# Patient Record
Sex: MALE | Race: WHITE | NOT HISPANIC OR LATINO | Employment: UNEMPLOYED | URBAN - NONMETROPOLITAN AREA
[De-identification: names, ages, dates, MRNs, and addresses within clinical notes are randomized per-mention and may not be internally consistent; named-entity substitution may affect disease eponyms.]

---

## 2017-01-03 ENCOUNTER — OFFICE VISIT (OUTPATIENT)
Dept: GASTROENTEROLOGY | Facility: CLINIC | Age: 32
End: 2017-01-03

## 2017-01-03 VITALS
HEIGHT: 72 IN | BODY MASS INDEX: 32.64 KG/M2 | OXYGEN SATURATION: 96 % | HEART RATE: 103 BPM | DIASTOLIC BLOOD PRESSURE: 86 MMHG | SYSTOLIC BLOOD PRESSURE: 142 MMHG | WEIGHT: 241 LBS

## 2017-01-03 DIAGNOSIS — B18.2 CHRONIC HEPATITIS C WITHOUT HEPATIC COMA (HCC): Primary | ICD-10-CM

## 2017-01-03 DIAGNOSIS — K29.70 GASTRITIS WITHOUT BLEEDING, UNSPECIFIED CHRONICITY, UNSPECIFIED GASTRITIS TYPE: ICD-10-CM

## 2017-01-03 PROCEDURE — 99213 OFFICE O/P EST LOW 20 MIN: CPT | Performed by: INTERNAL MEDICINE

## 2017-01-03 RX ORDER — LEDIPASVIR AND SOFOSBUVIR 90; 400 MG/1; MG/1
1 TABLET, FILM COATED ORAL DAILY
Qty: 30 TABLET | Refills: 2 | Status: SHIPPED | OUTPATIENT
Start: 2017-01-03 | End: 2017-12-15

## 2017-01-03 RX ORDER — PANTOPRAZOLE SODIUM 40 MG/1
40 TABLET, DELAYED RELEASE ORAL DAILY
Qty: 30 TABLET | Refills: 5 | Status: SHIPPED | OUTPATIENT
Start: 2017-01-03 | End: 2017-12-13 | Stop reason: SDUPTHER

## 2017-01-03 NOTE — LETTER
January 3, 2017     Rich Harrington MD  [address] [place] [address] [address] [place] [address]    Patient: Christiano King   YOB: 1985   Date of Visit: 1/3/2017       Dear Dr. Len MD:    Thank you for referring Christiano King to me for evaluation. Below are the relevant portions of my assessment and plan of care.      Christiano was seen today for hepatitis c, abdominal pain and nausea.    Diagnoses and all orders for this visit:    Chronic hepatitis C without hepatic coma  -     Ledipasvir-Sofosbuvir  MG tablet; Take 1 tablet by mouth Daily.  -     AFP Tumor Marker  -     CBC & Differential  -     Comprehensive Metabolic Panel  -     Hepatitis C RNA, Quantitative, PCR (graph)    Gastritis without bleeding, unspecified chronicity, unspecified gastritis type  -     pantoprazole (PROTONIX) 40 MG EC tablet; Take 1 tablet by mouth Daily.        * Surgery not found *     Diagnosis Plan   1. Chronic hepatitis C without hepatic coma  Ledipasvir-Sofosbuvir  MG tablet    AFP Tumor Marker    CBC & Differential    Comprehensive Metabolic Panel    Hepatitis C RNA, Quantitative, PCR (graph)   2. Gastritis without bleeding, unspecified chronicity, unspecified gastritis type  pantoprazole (PROTONIX) 40 MG EC tablet       Anticipated Surgical Procedure:  Orders Placed This Encounter   Procedures   • AFP Tumor Marker   • Comprehensive Metabolic Panel   • Hepatitis C RNA, Quantitative, PCR (graph)       The risks, benefits, and alternatives of this procedure have been discussed with the patient or the responsible party- the patient understands and agrees to proceed.                                                                              If you have questions, please do not hesitate to call me. I look forward to following Christiano along with you.         Sincerely,        Jamal Calhoun MD        CC: No Recipients

## 2017-01-03 NOTE — LETTER
January 3, 2017     Rich Harrington MD  [facility] [address] [place] [address]    Patient: Christiano King   YOB: 1985   Date of Visit: 1/3/2017       Dear Dr. Len MD:    Thank you for referring Christiano King to me for evaluation. Below is a copy of my consult note.    If you have questions, please do not hesitate to call me. I look forward to following Christiano along with you.         Sincerely,        Jamal Calhoun MD        CC: No Recipients    [facility]      Chief Complaint:   Chief Complaint   Patient presents with   • Hepatitis C   • Abdominal Pain   • Nausea       Subjective     HPI:   Patient with hepatitis C follow her follow-up.  Patient has cirrhosis of the liver with a very high fibrosis score.  Patient also with a mild elevation of his alpha-fetoprotein although CT scan done 6 months ago was normal.    Plan; we'll order Harvoni 1 tablet daily for 3 months because patient is genotype is 1A.  The patient follow up following this medication with repeat lab work including alpha-fetoprotein.    Past Medical History:   Past Medical History   Diagnosis Date   • Acquired deformities of toe(s), unspecified, right foot    • Chronic viral hepatitis C    • Cirrhosis of liver    • Generalized pain      Generalized aches and pains      • Hypertensive disorder    • Pain In Right Leg    • Right upper quadrant pain    • Tobacco dependence syndrome        Family History:  Family History   Problem Relation Age of Onset   • Diabetes Father    • Diabetes Brother        Social History:   reports that he has been smoking.  He does not have any smokeless tobacco history on file. He reports that he drinks alcohol.    Medications:     (Not in a hospital admission)    Allergies:  Penicillins    ROS:    Review of Systems   HENT: Negative for congestion, sore throat and trouble swallowing.    Respiratory: Negative for apnea, cough, choking, chest tightness, shortness of breath,  "wheezing and stridor.    Cardiovascular: Negative for chest pain, palpitations and leg swelling.   Gastrointestinal: Negative for abdominal distention, abdominal pain, anal bleeding, blood in stool, constipation, diarrhea, nausea, rectal pain and vomiting.   Skin: Negative for color change, pallor, rash and wound.   Neurological: Negative for dizziness, syncope, weakness and headaches.   Psychiatric/Behavioral: Negative for agitation, behavioral problems, confusion and decreased concentration.     Objective     Blood pressure 142/86, pulse 103, height 72\" (182.9 cm), weight 241 lb (109 kg), SpO2 96 %.    Physical Exam   Constitutional: He is oriented to person, place, and time. He appears well-developed and well-nourished. No distress.   HENT:   Head: Normocephalic and atraumatic.   Cardiovascular: Normal rate, regular rhythm, normal heart sounds and intact distal pulses.  Exam reveals no gallop and no friction rub.    No murmur heard.  Pulmonary/Chest: Breath sounds normal. No respiratory distress. He has no wheezes. He has no rales. He exhibits no tenderness.   Abdominal: Soft. Bowel sounds are normal. He exhibits no distension and no mass. There is no tenderness. There is no rebound and no guarding. No hernia.   Musculoskeletal: Normal range of motion. He exhibits no edema.   Neurological: He is alert and oriented to person, place, and time.   Skin: Skin is warm and dry. No rash noted. He is not diaphoretic. No erythema. No pallor.   Psychiatric: He has a normal mood and affect. His behavior is normal. Judgment and thought content normal.        Assessment/Plan   Christiano was seen today for hepatitis c, abdominal pain and nausea.    Diagnoses and all orders for this visit:    Chronic hepatitis C without hepatic coma  -     Ledipasvir-Sofosbuvir  MG tablet; Take 1 tablet by mouth Daily.  -     AFP Tumor Marker  -     CBC & Differential  -     Comprehensive Metabolic Panel  -     Hepatitis C RNA, Quantitative, " PCR (graph)    Gastritis without bleeding, unspecified chronicity, unspecified gastritis type  -     pantoprazole (PROTONIX) 40 MG EC tablet; Take 1 tablet by mouth Daily.        * Surgery not found *     Diagnosis Plan   1. Chronic hepatitis C without hepatic coma  Ledipasvir-Sofosbuvir  MG tablet    AFP Tumor Marker    CBC & Differential    Comprehensive Metabolic Panel    Hepatitis C RNA, Quantitative, PCR (graph)   2. Gastritis without bleeding, unspecified chronicity, unspecified gastritis type  pantoprazole (PROTONIX) 40 MG EC tablet       Anticipated Surgical Procedure:  Orders Placed This Encounter   Procedures   • AFP Tumor Marker   • Comprehensive Metabolic Panel   • Hepatitis C RNA, Quantitative, PCR (graph)       The risks, benefits, and alternatives of this procedure have been discussed with the patient or the responsible party- the patient understands and agrees to proceed.

## 2017-01-03 NOTE — PROGRESS NOTES
[facility]      Chief Complaint:   Chief Complaint   Patient presents with   • Hepatitis C   • Abdominal Pain   • Nausea       Subjective     HPI:   Patient with hepatitis C follow her follow-up.  Patient has cirrhosis of the liver with a very high fibrosis score.  Patient also with a mild elevation of his alpha-fetoprotein although CT scan done 6 months ago was normal.    Plan; we'll order Harvoni 1 tablet daily for 3 months because patient is genotype is 1A.  The patient follow up following this medication with repeat lab work including alpha-fetoprotein.    Past Medical History:   Past Medical History   Diagnosis Date   • Acquired deformities of toe(s), unspecified, right foot    • Chronic viral hepatitis C    • Cirrhosis of liver    • Generalized pain      Generalized aches and pains      • Hypertensive disorder    • Pain In Right Leg    • Right upper quadrant pain    • Tobacco dependence syndrome        Family History:  Family History   Problem Relation Age of Onset   • Diabetes Father    • Diabetes Brother        Social History:   reports that he has been smoking.  He does not have any smokeless tobacco history on file. He reports that he drinks alcohol.    Medications:     (Not in a hospital admission)    Allergies:  Penicillins    ROS:    Review of Systems   HENT: Negative for congestion, sore throat and trouble swallowing.    Respiratory: Negative for apnea, cough, choking, chest tightness, shortness of breath, wheezing and stridor.    Cardiovascular: Negative for chest pain, palpitations and leg swelling.   Gastrointestinal: Negative for abdominal distention, abdominal pain, anal bleeding, blood in stool, constipation, diarrhea, nausea, rectal pain and vomiting.   Skin: Negative for color change, pallor, rash and wound.   Neurological: Negative for dizziness, syncope, weakness and headaches.   Psychiatric/Behavioral: Negative for agitation, behavioral problems, confusion and  "decreased concentration.     Objective     Blood pressure 142/86, pulse 103, height 72\" (182.9 cm), weight 241 lb (109 kg), SpO2 96 %.    Physical Exam   Constitutional: He is oriented to person, place, and time. He appears well-developed and well-nourished. No distress.   HENT:   Head: Normocephalic and atraumatic.   Cardiovascular: Normal rate, regular rhythm, normal heart sounds and intact distal pulses.  Exam reveals no gallop and no friction rub.    No murmur heard.  Pulmonary/Chest: Breath sounds normal. No respiratory distress. He has no wheezes. He has no rales. He exhibits no tenderness.   Abdominal: Soft. Bowel sounds are normal. He exhibits no distension and no mass. There is no tenderness. There is no rebound and no guarding. No hernia.   Musculoskeletal: Normal range of motion. He exhibits no edema.   Neurological: He is alert and oriented to person, place, and time.   Skin: Skin is warm and dry. No rash noted. He is not diaphoretic. No erythema. No pallor.   Psychiatric: He has a normal mood and affect. His behavior is normal. Judgment and thought content normal.        Assessment/Plan   Christiano was seen today for hepatitis c, abdominal pain and nausea.    Diagnoses and all orders for this visit:    Chronic hepatitis C without hepatic coma  -     Ledipasvir-Sofosbuvir  MG tablet; Take 1 tablet by mouth Daily.  -     AFP Tumor Marker  -     CBC & Differential  -     Comprehensive Metabolic Panel  -     Hepatitis C RNA, Quantitative, PCR (graph)    Gastritis without bleeding, unspecified chronicity, unspecified gastritis type  -     pantoprazole (PROTONIX) 40 MG EC tablet; Take 1 tablet by mouth Daily.        * Surgery not found *     Diagnosis Plan   1. Chronic hepatitis C without hepatic coma  Ledipasvir-Sofosbuvir  MG tablet    AFP Tumor Marker    CBC & Differential    Comprehensive Metabolic Panel    Hepatitis C RNA, Quantitative, PCR (graph)   2. Gastritis without bleeding, unspecified " chronicity, unspecified gastritis type  pantoprazole (PROTONIX) 40 MG EC tablet       Anticipated Surgical Procedure:  Orders Placed This Encounter   Procedures   • AFP Tumor Marker   • Comprehensive Metabolic Panel   • Hepatitis C RNA, Quantitative, PCR (graph)       The risks, benefits, and alternatives of this procedure have been discussed with the patient or the responsible party- the patient understands and agrees to proceed.

## 2017-03-28 ENCOUNTER — OFFICE VISIT (OUTPATIENT)
Dept: FAMILY MEDICINE CLINIC | Facility: CLINIC | Age: 32
End: 2017-03-28

## 2017-03-28 VITALS
WEIGHT: 219 LBS | SYSTOLIC BLOOD PRESSURE: 130 MMHG | DIASTOLIC BLOOD PRESSURE: 100 MMHG | HEIGHT: 72 IN | BODY MASS INDEX: 29.66 KG/M2

## 2017-03-28 DIAGNOSIS — F99 INSOMNIA DUE TO OTHER MENTAL DISORDER: ICD-10-CM

## 2017-03-28 DIAGNOSIS — I10 ESSENTIAL HYPERTENSION: Primary | ICD-10-CM

## 2017-03-28 DIAGNOSIS — F51.05 INSOMNIA DUE TO OTHER MENTAL DISORDER: ICD-10-CM

## 2017-03-28 DIAGNOSIS — Z23 NEED FOR VACCINATION: ICD-10-CM

## 2017-03-28 DIAGNOSIS — G89.4 CHRONIC PAIN SYNDROME: ICD-10-CM

## 2017-03-28 DIAGNOSIS — F17.200 TOBACCO DEPENDENCE SYNDROME: ICD-10-CM

## 2017-03-28 PROCEDURE — 90471 IMMUNIZATION ADMIN: CPT | Performed by: FAMILY MEDICINE

## 2017-03-28 PROCEDURE — 99214 OFFICE O/P EST MOD 30 MIN: CPT | Performed by: FAMILY MEDICINE

## 2017-03-28 PROCEDURE — 90715 TDAP VACCINE 7 YRS/> IM: CPT | Performed by: FAMILY MEDICINE

## 2017-03-28 RX ORDER — GABAPENTIN 800 MG/1
TABLET ORAL
Qty: 90 TABLET | Refills: 5 | Status: SHIPPED | OUTPATIENT
Start: 2017-03-28 | End: 2017-12-15 | Stop reason: SDUPTHER

## 2017-03-28 RX ORDER — TRAZODONE HYDROCHLORIDE 50 MG/1
TABLET ORAL
Qty: 60 TABLET | Refills: 5 | Status: SHIPPED | OUTPATIENT
Start: 2017-03-28 | End: 2017-12-15

## 2017-03-28 RX ORDER — AMLODIPINE BESYLATE 5 MG/1
5 TABLET ORAL DAILY
Qty: 90 TABLET | Refills: 3 | Status: SHIPPED | OUTPATIENT
Start: 2017-03-28 | End: 2017-12-15 | Stop reason: SDUPTHER

## 2017-03-28 NOTE — PROGRESS NOTES
Subjective   Christiano King is a 31 y.o. male.     History of Present Illness just stay follow-up of hypertension.  His been visiting here since over a year.  Is currently split state seeing a pain management.  Also seeing GI for hepatitis.  Continue somewhat rambling pressured speech.  Requesting sleeping medicines again again counseled on no narcotics or benzodiazepines.  Does need a tetanus update.  Continues to smoke.    The following portions of the patient's history were reviewed and updated as appropriate: allergies, current medications, past family history, past medical history, past social history, past surgical history and problem list.    Review of Systems   Constitutional: Negative for activity change, appetite change, fatigue and unexpected weight change.   HENT: Negative for trouble swallowing and voice change.    Eyes: Negative for redness and visual disturbance.   Respiratory: Negative for cough and wheezing.    Cardiovascular: Negative for chest pain and palpitations.   Gastrointestinal: Negative for abdominal pain, constipation, diarrhea, nausea and vomiting.   Genitourinary: Negative for urgency.   Musculoskeletal: Positive for arthralgias, back pain, myalgias and neck pain. Negative for joint swelling.   Neurological: Negative for syncope and headaches.   Hematological: Negative for adenopathy.   Psychiatric/Behavioral: Negative for sleep disturbance.       Objective   Physical Exam   Constitutional: He appears well-developed.   HENT:   Head: Normocephalic.   Eyes: Pupils are equal, round, and reactive to light.   Neck: Normal range of motion.   Cardiovascular: Normal rate, regular rhythm, normal heart sounds and intact distal pulses.    No murmur heard.  Pulmonary/Chest: Effort normal.   Abdominal: Soft.   Musculoskeletal:   Right foot chronic deformity from scarring from frostbite and surgery.  Calluses noted.  Back shows normal range of motion.   Neurological: No cranial nerve deficit  or sensory deficit.   Psychiatric: His mood appears anxious. His speech is rapid and/or pressured. He is agitated.       Assessment/Plan   Problems Addressed this Visit        Cardiovascular and Mediastinum    Hypertensive disorder - Primary    Relevant Medications    amLODIPine (NORVASC) 5 MG tablet       Other    Tobacco dependence syndrome    Chronic pain syndrome    Relevant Medications    gabapentin (NEURONTIN) 800 MG tablet      Other Visit Diagnoses     Insomnia due to other mental disorder        Relevant Medications    traZODone (DESYREL) 50 MG tablet    Need for vaccination        Relevant Orders    Tdap Vaccine Greater Than or Equal To 8yo IM (Completed)           on stopping smoking.  3-10m     Defer to GI pain management at this point.  Counseled on lifestyle measures regards to blood pressure control.  Recheck in 6 months

## 2017-07-12 ENCOUNTER — OFFICE VISIT (OUTPATIENT)
Dept: PAIN MEDICINE | Facility: CLINIC | Age: 32
End: 2017-07-12

## 2017-07-12 ENCOUNTER — APPOINTMENT (OUTPATIENT)
Dept: LAB | Facility: HOSPITAL | Age: 32
End: 2017-07-12

## 2017-07-12 VITALS
WEIGHT: 212.8 LBS | SYSTOLIC BLOOD PRESSURE: 122 MMHG | DIASTOLIC BLOOD PRESSURE: 80 MMHG | HEIGHT: 73 IN | BODY MASS INDEX: 28.2 KG/M2

## 2017-07-12 DIAGNOSIS — F17.200 SMOKING ADDICTION: ICD-10-CM

## 2017-07-12 DIAGNOSIS — Z79.899 HIGH RISK MEDICATIONS (NOT ANTICOAGULANTS) LONG-TERM USE: ICD-10-CM

## 2017-07-12 DIAGNOSIS — M79.671 CHRONIC FOOT PAIN, RIGHT: ICD-10-CM

## 2017-07-12 DIAGNOSIS — Z91.89: ICD-10-CM

## 2017-07-12 DIAGNOSIS — M20.61 ACQUIRED DEFORMITIES OF TOE(S), UNSPECIFIED, RIGHT FOOT: Primary | ICD-10-CM

## 2017-07-12 DIAGNOSIS — M79.18 MYOFACIAL MUSCLE PAIN: ICD-10-CM

## 2017-07-12 DIAGNOSIS — G89.29 CHRONIC FOOT PAIN, RIGHT: ICD-10-CM

## 2017-07-12 PROCEDURE — G0481 DRUG TEST DEF 8-14 CLASSES: HCPCS | Performed by: NURSE PRACTITIONER

## 2017-07-12 PROCEDURE — 99204 OFFICE O/P NEW MOD 45 MIN: CPT | Performed by: PAIN MEDICINE

## 2017-07-12 PROCEDURE — 80307 DRUG TEST PRSMV CHEM ANLYZR: CPT | Performed by: NURSE PRACTITIONER

## 2017-07-12 NOTE — PROGRESS NOTES
"Christiano King is a 32 y.o. male.   1985    HPI:   Location: right foot  Quality: stabbing, throbbing and sharp  Severity: 8/10  Timing: constant  Alleviating: stay off of it  Aggravating: ambulating     Patient referred to pain management via Dr. Rich Harrington related to chronic lower right leg and right foot pain. Pt reports \"I was drinking and passed out in the snow for 2 days in  2013\". Pt was taken to [facility] and referred to Dr. Mcgee to have surgery (Debrided right foot toe diffusely to the tops). Pt with chronic pain to toes and foot. Pt was sent to Podiatry in [place] DR. Valente at one point. Pt was referred to pain management in [place]- Rajesh Dykes. Pt reports \"Was having oral surgery done and taking medication\" and was DC. I have reviewed ancillary notes from Rajesh dykes- and listed below.       The following portions of the patient's history were reviewed by me and updated as appropriate: allergies, current medications, past family history, past medical history, past social history, past surgical history and problem list.    Past Medical History:   Diagnosis Date   • Acquired deformities of toe(s), unspecified, right foot    • Chronic pain disorder    • Chronic viral hepatitis C    • Cirrhosis of liver    • Generalized pain     Generalized aches and pains      • Hypertensive disorder    • Pain In Right Leg    • Right upper quadrant pain    • Tobacco dependence syndrome        Social History     Social History   • Marital status: Single     Spouse name: N/A   • Number of children: N/A   • Years of education: N/A     Occupational History   • Not on file.     Social History Main Topics   • Smoking status: Current Every Day Smoker   • Smokeless tobacco: Never Used      Comment: 10/19 cigarettes/day   • Alcohol use Yes      Comment: Beer   • Drug use: No   • Sexual activity: Not on file     Other Topics Concern   • Not on file     Social History Narrative       Family History   Problem " Relation Age of Onset   • Diabetes Father    • Diabetes Brother    • Coronary artery disease Brother    • Diabetes Mother    • Hypertension Mother    • Cancer Paternal Aunt          Current Outpatient Prescriptions:   •  amLODIPine (NORVASC) 5 MG tablet, Take 1 tablet by mouth Daily., Disp: 90 tablet, Rfl: 3  •  gabapentin (NEURONTIN) 800 MG tablet, Take 1-2 tablets HS.  May refill every 30 days, Disp: 90 tablet, Rfl: 5  •  Ledipasvir-Sofosbuvir  MG tablet, Take 1 tablet by mouth Daily., Disp: 30 tablet, Rfl: 2  •  pantoprazole (PROTONIX) 40 MG EC tablet, Take 1 tablet by mouth Daily., Disp: 30 tablet, Rfl: 5  •  traZODone (DESYREL) 50 MG tablet, 1-2qs for sleep every hs, Disp: 60 tablet, Rfl: 5    Allergies   Allergen Reactions   • Penicillins Swelling     Rx:  Facial swelling       Review of Systems   Musculoskeletal:        R.foot   Neurological: Positive for headaches.   Psychiatric/Behavioral: The patient is nervous/anxious.    All other systems reviewed and are negative.    All review of systems reviewed and negative except for above.    Physical Exam   Constitutional: He is oriented to person, place, and time. He appears well-developed and well-nourished.   Pt with unkept appearance    Pt with surgical deformities to right foot    No dentition    HENT:   Head: Normocephalic and atraumatic.   Eyes: Conjunctivae and EOM are normal. Pupils are equal, round, and reactive to light.   Neck: Normal range of motion. Neck supple.   Cardiovascular: Normal rate and regular rhythm.    Pulmonary/Chest: Effort normal and breath sounds normal.   Abdominal: Soft. Bowel sounds are normal.   Musculoskeletal:        Cervical back: He exhibits tenderness ( muscle TTP). He exhibits normal range of motion.        Thoracic back: He exhibits tenderness.        Lumbar back: He exhibits tenderness ( muscle TTP). Decreased range of motion:  full flexion.        Right foot: There is tenderness.   Pt with muscle TTP to upper back,  middle back, and lower back        Neurological: He is alert and oriented to person, place, and time. He displays no tremor and normal reflexes. A sensory deficit ( reports light touch deficit lower right leg and foot) is present. No cranial nerve deficit. He exhibits normal muscle tone. He displays no seizure activity. Gait ( antalgic) abnormal. Coordination normal.   Reflex Scores:       Tricep reflexes are 2+ on the right side and 2+ on the left side.       Bicep reflexes are 2+ on the right side and 2+ on the left side.       Brachioradialis reflexes are 2+ on the right side and 2+ on the left side.       Patellar reflexes are 2+ on the right side and 2+ on the left side.       Achilles reflexes are 1+ on the right side and 1+ on the left side.  Neg SLR    Upper arm strength 5/5    Lower leg strength 5/5   Skin: Skin is warm and dry.   Psychiatric: He has a normal mood and affect. His behavior is normal. Judgment normal. He expresses no homicidal and no suicidal ideation. He expresses no suicidal plans and no homicidal plans.   Nursing note and vitals reviewed.      Christiano was seen today for pain.    Diagnoses and all orders for this visit:    Acquired deformities of toe(s), unspecified, right foot    Chronic foot pain, right  -     Ambulatory Referral to Physical Therapy Evaluate and treat (back muscles and  right foot and gait)    Smoking addiction    Hx of frostbite  -     Ambulatory Referral to Physical Therapy Evaluate and treat (back muscles and  right foot and gait)    Myofacial muscle pain  -     Ambulatory Referral to Physical Therapy Evaluate and treat (back muscles and  right foot and gait)    High risk medications (not anticoagulants) long-term use  -     ToxASSURE Select 13 (MW)        Medication:  I have discussed and ordered topical compounded cream and ingredients with proper usages.     Interventional: No opiates will be RX. I will eval with a UDS, usage of compound cream, and PT. Pt may need  "referral to podiatry via pcp.  I have discussed smoking cessation and plan with patient up to 10 minutes, unsure of compliance.    I have reviewed ancillary notes and images for today's examination;    I have reviewed ancillary notes from Rajesh Rodarte as listed below:    \"Telephone Encounter - 03/27/2017 10:13 AM CDT  Random pill count and UDS, I called both numbers on his chart and both are not reachable. Missed random pill count \"       \"Current status of pain: Patient returns to clinic today for followup and medication refills. The patient did miss a couple of pill counts in the interim and his [facility] report reveals noncompliance and breech of contract and I discussed this in detail with the patient and unfortunately I will no longer be able to prescribe pain medication for him due to these contract breaches. A taper prescription was given. No changes in locations, character, exacerbating and relieving factors in the interim and the pain level today is 10/10, at best 7/10. Norco 7.5/325mg TID prn is helping control the pain and improving function and overall quality of life by report. Patient reports compliance with treatment in the interim and is keeping the pain medication locked in a safe place.\"        Rehab: I have discussed and ordered rehabillitation for back musles right foot and gait    Behavioral: No aberrant behavior noted. PRES[facility] Report # [id number]  was reviewed and is consistent with stated history    Urine drug screen Ordered today to test for drugs of abuse and prescribed medications. NO OPIATE MEDICATIONS WILL BE RX.     ORT: 4 Pt with DX Anxiety. No SI thoughts or SI ideas. Pt has seen Pennyrile for Anxiety in past. Offered mental health information regarding to any situation that patient may need assistance.  Informed patient to seek emergency counseling or treatment if any uncontrollable depression changes or suicidal thoughts are noted.      PHQ-9: 10          This document has been electronically " signed by VALERIE Couch on July 12, 2017 11:07 AM          This document has been electronically signed by VALERIE Couch on July 12, 2017 11:07 AM

## 2017-07-17 ENCOUNTER — TELEPHONE (OUTPATIENT)
Dept: PAIN MEDICINE | Facility: CLINIC | Age: 32
End: 2017-07-17

## 2017-07-19 ENCOUNTER — CONSULT (OUTPATIENT)
Dept: PHYSICAL THERAPY | Facility: CLINIC | Age: 32
End: 2017-07-19

## 2017-07-19 DIAGNOSIS — M79.18 MYOFASCIAL MUSCLE PAIN: ICD-10-CM

## 2017-07-19 DIAGNOSIS — M79.671 CHRONIC PAIN IN RIGHT FOOT: Primary | ICD-10-CM

## 2017-07-19 DIAGNOSIS — G89.29 CHRONIC PAIN IN RIGHT FOOT: Primary | ICD-10-CM

## 2017-07-19 PROCEDURE — 97161 PT EVAL LOW COMPLEX 20 MIN: CPT | Performed by: PHYSICAL THERAPIST

## 2017-07-19 NOTE — PROGRESS NOTES
Outpatient Physical Therapy Ortho Initial Evaluation       Patient Name: Christiano King  : 1985  MRN: [id number]  Today's Date: 2017      Visit Date: 2017  Visit   Return to MD: PAZ  Re-cert date: 17    TIME IN 1320    TIME OUT 1415    Patient Active Problem List   Diagnosis   • Chronic hepatitis C without hepatic coma   • Gastritis without bleeding   • Hypertensive disorder   • Tobacco dependence syndrome   • Acquired deformities of toe(s), unspecified, right foot   • Chronic pain syndrome        Past Medical History:   Diagnosis Date   • Acquired deformities of toe(s), unspecified, right foot    • Chronic pain disorder    • Chronic viral hepatitis C    • Cirrhosis of liver    • Generalized pain     Generalized aches and pains      • Hypertensive disorder    • Pain In Right Leg    • Right upper quadrant pain    • Tobacco dependence syndrome         Past Surgical History:   Procedure Laterality Date   • TOE AMPUTATION      PARTIAL AMPUTATION OF TOE 17172 (1)          Visit Dx:     ICD-10-CM ICD-9-CM   1. Chronic pain in right foot M79.671 729.5    G89.29 338.29   2. Myofascial muscle pain M79.1 729.1     Outpatient Medications     amLODIPine (NORVASC) 5 MG tablet      gabapentin (NEURONTIN) 800 MG tablet      Ledipasvir-Sofosbuvir  MG tablet      pantoprazole (PROTONIX) 40 MG EC tablet      traZODone (DESYREL) 50 MG tablet      Allergies: Penicillins    Subjective Evaluation    History of Present Illness  Mechanism of injury: 31 yo male with chronic right foot pain, h/o necrotic black tissue with 2nd-3rd digits of right foot, had right 2nd-3rd toe amputation in . Since surgery has had steadily worsening right foot pain, with significant right hip and right knee pain as well as low back pain.     Quality of life: good    Pain  Current pain ratin  At best pain ratin  Location: right foot  Quality: sharp, pressure and dull ache  Relieving factors:  medications  Aggravating factors: stairs, ambulation and standing  Progression: worsening    Social Support  Lives with: parents    Hand dominance: right    Treatments  Current treatment: physical therapy  Patient Goals  Patient goals for therapy: decreased pain  Patient goal:   STG's (2 weeks)  1. Pt I with HEP.  2. Improve right ankle PF to 45 degrees.  3. Reduce right ankle pain to 4/10 with prolonged walking.    LTG's (in 4 weeks)  1. Reduce right ankle pain to 2/10 or less with prolonged walking.  2. Improve LEFS score to 37 or greater.             AROM RLE (degrees)  R Ankle Dorsiflexion 0-20: 6  R Ankle Plantar Flexion 0-45: 37  R Ankle Inversion: 30  R Ankle Eversion: 10     Strength RLE  R Hip Flexion: 5/5  R Knee Flexion: 5/5  R Knee Extension: 5/5  R Ankle Dorsiflexion: 5/5  R Ankle Plantar Flexion: 5/5  R Ankle Strength Inversion:  (5/5)  R Ankle Strength Eversion:  (5/5)  1st Toe Extension: 4/5        AROM LLE (degrees)  L Ankle Dorsiflexion 0-20: 5  L Ankle Plantar Flexion 0-45: 30  R Ankle Inversion: 26  R Ankle Eversion: 10     Strength LLE  L Hip Flexion: 5/5  L Knee Flexion: 5/5  L Knee Extension: 5/5  L Ankle Dorsiflexion: 5/5  L Ankle Plantar Flexion: 5/5  L Ankle Strength Inversion: 5/5  L Ankle Strength Eversion: 4+/5  1st Toe Extension: 5/5               Body Part  Body Part: Foot and Ankle, Lumbar              Ankle/Foot Special Tests  Anterior drawer (ATFL lesion): Right:, Negative  Talar tilt test (instability): Right:, Negative        Lumbar Spine Range of Motion  Lumbar Spine Flexion:  (%)  Lumbar Spine Extension:  (75% minimal limit)  Lumbar Spine Sidebend Left:  (WNL)  Lumbar Spine Sidebend Right:  (WNL)  Lumbar Spine Rotation Left:  (WNL)  Lumbar Spine Rotation Right:  (WNL)     Lumbar Special Tests  SLR (Neural Tension): Bilateral:, Positive  SI Compression Test (SI Dysfunction): Positive  SI Distraction Test (SI Dysfunction): Negative                     Exercises        07/19/17 1415          Exercise 1    Exercise Name 1 prone lying  -BS      Exercise 2    Exercise Name 2 seated ankle circles  -BS      Exercise 3    Exercise Name 3 seated B heel/toe raises  -BS        User Key  (r) = Recorded By, (t) = Taken By, (c) = Cosigned By    Initials Name Provider Type    CESAR Varner, PT Physical Therapist                     Assessment & Plan     Assessment  Impairments: abnormal or restricted ROM, lacks appropriate home exercise program, pain with function and weight-bearing intolerance  Assessment details: 31 yo male with chronic right ankle pain with unclear etiology. H/o 2nd-3rd toe surgical debridement in 2013 due to frostbite (passed out while drinking in the snow). Since then has had increased right ankle pain. Patient presents with low back pain and evidence of right LE radiculopathy. Special test evidence positive for adverse neural tissue tension with the R LE (+SLR with RLE), TTP along right talus, and impaired light touch sensation with R>L LE, including bilateral feet.  Prognosis: fair    Goals    STG's (2 weeks)  1. Pt I with HEP.  2. Improve right ankle PF to 45 degrees.  3. Reduce right ankle pain to 4/10 with prolonged walking.    LTG's (in 4 weeks)  1. Reduce right ankle pain to 2/10 or less with prolonged walking.  2. Improve LEFS score to 37 or greater.      Plan  Therapy options: will be seen for skilled physical therapy services  Planned modality interventions: cryotherapy, electrical stimulation/Russian stimulation, thermotherapy (hydrocollator packs), ultrasound and traction  Planned therapy interventions: joint mobilization, home exercise program, functional ROM exercises, flexibility, body mechanics training, balance/weight-bearing training, manual therapy, neuromuscular re-education, postural training, soft tissue mobilization, spinal/joint mobilization, strengthening, stretching and therapeutic activities  Frequency: 2x week  Duration in weeks:  4  Treatment plan discussed with: patient  Plan details: rule out lumbar spine as a source of referred pain to the right ankle.  Functional Limitations: walking and standing      Time Calculation: 55              Gt Varner, PT  7/19/2017        Christiano King    1985

## 2017-07-21 LAB — CONV REPORT SUMMARY: NORMAL

## 2017-08-14 ENCOUNTER — TREATMENT (OUTPATIENT)
Dept: PHYSICAL THERAPY | Facility: CLINIC | Age: 32
End: 2017-08-14

## 2017-08-14 DIAGNOSIS — M79.671 CHRONIC PAIN IN RIGHT FOOT: Primary | ICD-10-CM

## 2017-08-14 DIAGNOSIS — M79.18 MYOFASCIAL MUSCLE PAIN: ICD-10-CM

## 2017-08-14 DIAGNOSIS — G89.29 CHRONIC PAIN IN RIGHT FOOT: Primary | ICD-10-CM

## 2017-08-14 PROCEDURE — 97110 THERAPEUTIC EXERCISES: CPT | Performed by: PHYSICAL THERAPIST

## 2017-08-14 NOTE — PROGRESS NOTES
"Daily Treatment Note    Time In: 10:18     Time Out: 10:54    ICD-10-CM ICD-9-CM   1. Chronic pain in right foot M79.671 729.5    G89.29 338.29   2. Myofascial muscle pain M79.1 729.1       Subjective   Pt reports increased pain today 2° amb. He has pain from foot up into LB. He reports doing HEP.   Patient reports to therapy 10/10 pain, and  % improvement.  Attendance  2/4 visits. Recert next treatment. MD f/u PAZ.      Objective    Amb with R gait deviation. V cues necessary for correct TE performance. Intermittent c/o pain with TE. Ant hip tightness noted with SKC. Post treatment pain 8/10. Pt needed to leave treatment early 2° transportation.          EXERCISE  Exercise 1  Exercise Name 1: PRO2  Time: 6'  Exercise 2  Exercise Name 2: Incline bd stretch  Time 2: 1' Exercise 3  Exercise Name 3: Incline bd soleus stretch  Time 3: 1' Exercise 4  Exercise Name 4: PF stretch  Time 4: 1' Exercise 5  Exercise Name 5: Ankle AROM: 4-way, circles  Sets/Reps 5: 20x ea Exercise 6  Exercise Name 6: [place] bd  Sets/Reps 6: 1' (difficulty performing)   Exercise 7  Exercise Name 7: SILVINA  Time 7: 3' Exercise 8  Exercise Name 8: R SKC  Sets/Reps 8: 2/30\" Exercise 9  Exercise Name 9: B ant hip stretch on step  Sets/Reps 9: 3/20\"                                       Therapy Exercise 28125 36 minutes    Total Treatment Time: 36 Minutes    Assessment/Plan   Fair bharati of today's treatment. Compliance with visits needs increasing. No significant changes from eval thus far.   Progress per Plan of Care             Yoel Fan, PTA  Physical Therapist    "

## 2017-08-17 ENCOUNTER — TREATMENT (OUTPATIENT)
Dept: PHYSICAL THERAPY | Facility: CLINIC | Age: 32
End: 2017-08-17

## 2017-08-17 DIAGNOSIS — M79.671 CHRONIC PAIN IN RIGHT FOOT: Primary | ICD-10-CM

## 2017-08-17 DIAGNOSIS — G89.29 CHRONIC PAIN IN RIGHT FOOT: Primary | ICD-10-CM

## 2017-08-17 DIAGNOSIS — M79.18 MYOFASCIAL MUSCLE PAIN: ICD-10-CM

## 2017-08-17 PROCEDURE — 97110 THERAPEUTIC EXERCISES: CPT | Performed by: PHYSICAL THERAPIST

## 2017-08-17 NOTE — PROGRESS NOTES
"Daily Treatment Note    Time In: 10:00     Time Out: 10:30    ICD-10-CM ICD-9-CM   1. Chronic pain in right foot M79.671 729.5    G89.29 338.29   2. Myofascial muscle pain M79.1 729.1       Subjective   Pt reports mod pain. He has not been out of bed long.   Patient reports to therapy 7/10 pain, and  % improvement.  Attendance  3/5 visits. Recert next treatment . MD f/u PAZ.      Objective     V cues necessary for correct TE performance. Intermittent pain with TE.          EXERCISE  Exercise 1  Exercise Name 1: Bike  Time: 7'  Exercise 2  Exercise Name 2: Incline bd stretch  Time 2: 1' Exercise 3  Exercise Name 3: HS stretch @ step  Time 3: 1' Exercise 4  Exercise Name 4: PF stretch standing  Sets/Reps 4: 2/30\" Exercise 5  Exercise Name 5: Toe curls  Sets/Reps 5: 20x Exercise 6  Exercise Name 6: Ankle ROM: 4-way, circles  Sets/Reps 6: 20x ea   Exercise 7  Exercise Name 7: Resisted PF  Equipment/Resistance 7: green tb  Sets/Reps 7: 30x Exercise 8  Exercise Name 8: Seated CR  Sets/Reps 8: 30x Exercise 9  Exercise Name 9: R SKC  Sets/Reps 9: 2/30\" Exercise 10  Exercise Name 10: R Piriformis stretch  Sets/Reps 10: 2/30\"                                   Therapy Exercise 35700 30 minutes    Total Treatment Time: 30 Minutes    Assessment/Plan   Fair bharati of today's TE. No goals met today. Pt continues to benefit from PT for further progression towards goals.  Progress per Plan of Care             Yoel Fan, PTA  Physical Therapist    "

## 2017-08-22 ENCOUNTER — TREATMENT (OUTPATIENT)
Dept: PHYSICAL THERAPY | Facility: CLINIC | Age: 32
End: 2017-08-22

## 2017-08-22 DIAGNOSIS — M79.18 MYOFASCIAL MUSCLE PAIN: ICD-10-CM

## 2017-08-22 DIAGNOSIS — G89.29 CHRONIC PAIN IN RIGHT FOOT: Primary | ICD-10-CM

## 2017-08-22 DIAGNOSIS — M79.671 CHRONIC PAIN IN RIGHT FOOT: Primary | ICD-10-CM

## 2017-08-22 PROCEDURE — 97110 THERAPEUTIC EXERCISES: CPT | Performed by: PHYSICAL THERAPIST

## 2017-08-22 NOTE — PROGRESS NOTES
"Daily Treatment Note    Time In: 10:10    Time Out: 10:54    ICD-10-CM ICD-9-CM   1. Chronic pain in right foot M79.671 729.5    G89.29 338.29   2. Myofascial muscle pain M79.1 729.1       Subjective   Pt cont to c/o foot pain up into LB.   Patient reports to therapy 8/10 pain, and % improvement.  Attendance  4/6 visits. MD f/u PAZ.      Objective    Amb with R deviation. V cues necessary for correct TE performance. HEP reviewed. Post treatment pain 7/10.     AROM:    DF: 8°, PF: 38°.           EXERCISE  Exercise 1  Exercise Name 1: Bike  Time: 8.5'  Exercise 2  Exercise Name 2: Incline bd stretch  Time 2: 1' Exercise 3  Exercise Name 3: Seated PF stretch  Time 3: 1' Exercise 4  Exercise Name 4: Toe curls  Sets/Reps 4: 30x Exercise 5  Exercise Name 5: Ankle EV/IV  Equipment/Resistance 5: 2#   Sets/Reps 5: 2/20x Exercise 6  Exercise Name 6: PF  Equipment/Resistance 6: green tb  Sets/Reps 6: 30x   Exercise 7  Exercise Name 7: R SKC  Sets/Reps 7: 2/30\" Exercise 8  Exercise Name 8: SILVINA  Time 8: 3' Exercise 9  Exercise Name 9: LTR  Sets/Reps 9: 10x, 2\" Exercise 10  Exercise Name 10: SLS  Sets/Reps 10: 45\" Exercise 11  Exercise Name 11: Airex: SLS  Sets/Reps 11: 2/40\"                                   Therapy Exercise 88990 44 minutes    Total Treatment Time: 44 Minutes    Assessment/Plan   Ankle ROM increased. Good tolerance of today's treatment despite continued high pain rating.    Progress per Plan of Care             Yoel Fan, PTA  Physical Therapist    "

## 2017-08-24 ENCOUNTER — TREATMENT (OUTPATIENT)
Dept: PHYSICAL THERAPY | Facility: CLINIC | Age: 32
End: 2017-08-24

## 2017-08-24 DIAGNOSIS — G89.29 CHRONIC PAIN IN RIGHT FOOT: Primary | ICD-10-CM

## 2017-08-24 DIAGNOSIS — M79.18 MYOFASCIAL MUSCLE PAIN: ICD-10-CM

## 2017-08-24 DIAGNOSIS — M79.671 CHRONIC PAIN IN RIGHT FOOT: Primary | ICD-10-CM

## 2017-08-24 PROCEDURE — 97110 THERAPEUTIC EXERCISES: CPT | Performed by: PHYSICAL THERAPIST

## 2017-08-24 NOTE — PROGRESS NOTES
"PT Discharge Summary    Diagnosis/ICD-10 Code:  Chronic pain in right foot [M79.671, G89.29]  Referring practitioner: VALERIE Tolliver  Date of Initial Visit: 8/24/2017  Patient seen for 5/7 sessions    Time in: 1015 Time out: 1100 Total time: 45 min  Subjective:   Christiano King states: patient reports 9/10 constant right ankle pain with prolonged walking, minimal to no change overall in terms of function with the right foot since starting skilled PT.      Objective:   Current condition: Poor  Test measurement: LEFS score 21/80, right ankle PF 30 deg DF 7 deg MMT: right ankle 5/5 (DF/PF/jaja/inv); SLS: R 3\", L 8\"      Assessment:   Summary of Treatment:     EXERCISE  Exercise 1  Exercise Name 1: Bike  Time: 10'  Exercise 2  Exercise Name 2: Incline bd stretch  Sets/Reps 2: 1/3  Time 2: 15\" Exercise 3  Exercise Name 3: forward lunge w/ R ankle DF (clinician post glide at [facility])  Sets/Reps 3: 2/10 Exercise 4  Exercise Name 4: resisted R ankle PF w/ green TB  Sets/Reps 4: 1/20 Exercise 5  Exercise Name 5: SLS  Time 5: 3\" on R, 8\" on L       Therapeutic exercise 12294: 45 min  Progress toward previous goals: pt reached a functional plateau, all goals unmet        Plan:   Goals  STG's (2 weeks)  1. Pt I with HEP.  2. Improve right ankle PF to 45 degrees.  3. Reduce right ankle pain to 4/10 with prolonged walking.    LTG's (in 4 weeks)  1. Reduce right ankle pain to 2/10 or less with prolonged walking.  2. Improve LEFS score to 37 or greater.    Timeframe: N/A-discharged from PT at this time.  Prognosis to achieve goals: poor    Plan  Treatment plan with rationale: f/u with referring provider to address lumbar spine and LE radicular concerns.  Recommendations: instructed to see referring provider regarding ongoing symptom management.    PT Signature: Gt Varner, PT      Based upon review of the patient's lack of progress it is my medical opinion that Christiano King should discharge from skilled physical " therapy treatment at [facility]  [address] [place] [address] Dr Kate CARTY [id number] [phone].    Signature:  VALERIE Tolliver

## 2017-08-31 ENCOUNTER — TELEPHONE (OUTPATIENT)
Dept: FAMILY MEDICINE CLINIC | Facility: CLINIC | Age: 32
End: 2017-08-31

## 2017-09-05 ENCOUNTER — OFFICE VISIT (OUTPATIENT)
Dept: PAIN MEDICINE | Facility: CLINIC | Age: 32
End: 2017-09-05

## 2017-09-05 VITALS
BODY MASS INDEX: 27.8 KG/M2 | WEIGHT: 209.8 LBS | DIASTOLIC BLOOD PRESSURE: 80 MMHG | HEIGHT: 73 IN | SYSTOLIC BLOOD PRESSURE: 130 MMHG

## 2017-09-05 DIAGNOSIS — M20.61 ACQUIRED DEFORMITIES OF TOE(S), UNSPECIFIED, RIGHT FOOT: ICD-10-CM

## 2017-09-05 DIAGNOSIS — G89.29 CHRONIC MIDLINE LOW BACK PAIN WITHOUT SCIATICA: ICD-10-CM

## 2017-09-05 DIAGNOSIS — M79.671 CHRONIC FOOT PAIN, RIGHT: Primary | ICD-10-CM

## 2017-09-05 DIAGNOSIS — M54.50 CHRONIC MIDLINE LOW BACK PAIN WITHOUT SCIATICA: ICD-10-CM

## 2017-09-05 DIAGNOSIS — G89.29 CHRONIC FOOT PAIN, RIGHT: Primary | ICD-10-CM

## 2017-09-05 DIAGNOSIS — Z91.89: ICD-10-CM

## 2017-09-05 DIAGNOSIS — M79.18 MYOFACIAL MUSCLE PAIN: ICD-10-CM

## 2017-09-05 PROCEDURE — 99214 OFFICE O/P EST MOD 30 MIN: CPT | Performed by: PAIN MEDICINE

## 2017-09-05 NOTE — PROGRESS NOTES
"Christiano King is a 32 y.o. male.   1985    HPI:   Location: right foot  Quality: stabbing, throbbing and sharp  Severity: 8/10  Timing: constant  Alleviating: stay off of it  Aggravating: ambulating     Pt return visit from initial- pt reports 'I finished and discharged from  PT. Topical cream did not work as well. I want to see Dr. Graves for pain meds\". Discussed case with Dr. Graves:  I explained that no opiates will be given.  Pt may follow up for any non-opiate interventions. Pt may need to follow up with Podiatry, pt states \"I will call if I decide to do that\".       I have read ancillary notes from PT:    Plan  Therapy options: will be seen for skilled physical therapy services  Planned modality interventions: cryotherapy, electrical stimulation/Russian stimulation, thermotherapy (hydrocollator packs), ultrasound and traction  Planned therapy interventions: joint mobilization, home exercise program, functional ROM exercises, flexibility, body mechanics training, balance/weight-bearing training, manual therapy, neuromuscular re-education, postural training, soft tissue mobilization, spinal/joint mobilization, strengthening, stretching and therapeutic activities  Frequency: 2x week  Duration in weeks: 4  Treatment plan discussed with: patient  Plan details: rule out lumbar spine as a source of referred pain to the right ankle.  Functional Limitations: walking and standing            The following portions of the patient's history were reviewed by me and updated as appropriate: allergies, current medications, past family history, past medical history, past social history, past surgical history and problem list.    Past Medical History:   Diagnosis Date   • Acquired deformities of toe(s), unspecified, right foot    • Chronic pain disorder    • Chronic viral hepatitis C    • Cirrhosis of liver    • Generalized pain     Generalized aches and pains      • Hypertensive disorder    • Pain In Right Leg  "   • Right upper quadrant pain    • Tobacco dependence syndrome        Social History     Social History   • Marital status: Single     Spouse name: N/A   • Number of children: N/A   • Years of education: N/A     Occupational History   • Not on file.     Social History Main Topics   • Smoking status: Current Every Day Smoker   • Smokeless tobacco: Current User     Types: Snuff      Comment: 10/19 cigarettes/day   • Alcohol use Yes      Comment: Beer   • Drug use: No   • Sexual activity: Not on file     Other Topics Concern   • Not on file     Social History Narrative       Family History   Problem Relation Age of Onset   • Diabetes Father    • Diabetes Brother    • Coronary artery disease Brother    • Diabetes Mother    • Hypertension Mother    • Cancer Paternal Aunt          Current Outpatient Prescriptions:   •  amLODIPine (NORVASC) 5 MG tablet, Take 1 tablet by mouth Daily., Disp: 90 tablet, Rfl: 3  •  gabapentin (NEURONTIN) 800 MG tablet, Take 1-2 tablets HS.  May refill every 30 days, Disp: 90 tablet, Rfl: 5  •  Ledipasvir-Sofosbuvir  MG tablet, Take 1 tablet by mouth Daily., Disp: 30 tablet, Rfl: 2  •  pantoprazole (PROTONIX) 40 MG EC tablet, Take 1 tablet by mouth Daily., Disp: 30 tablet, Rfl: 5  •  traZODone (DESYREL) 50 MG tablet, 1-2qs for sleep every hs, Disp: 60 tablet, Rfl: 5    Allergies   Allergen Reactions   • Penicillins Swelling     Rx:  Facial swelling       Review of Systems   Musculoskeletal:        R.foot     All other systems reviewed and are negative.    All systems reviewed and negative except for above.    Physical Exam   Constitutional: He is oriented to person, place, and time. He appears well-developed and well-nourished.   Pt with unkept appearance    Pt with surgical deformities to right foot    No dentition    HENT:   Head: Normocephalic and atraumatic.   Eyes: Pupils are equal, round, and reactive to light.   Neck: Normal range of motion.   Cardiovascular: Normal rate and  "regular rhythm.    Pulmonary/Chest: Effort normal.   Abdominal: Soft.   Musculoskeletal:        Lumbar back: He exhibits tenderness (Muscle TTP ). He exhibits normal range of motion ( full flexion on exam).        Right foot: There is tenderness.   Pt with muscle TTP to upper back, middle back, and lower back        Neurological: He is alert and oriented to person, place, and time. He displays no tremor and normal reflexes. A sensory deficit ( reports light touch deficit lower right foot) is present. No cranial nerve deficit. He exhibits normal muscle tone. He displays no seizure activity. Gait ( antalgic - right foot pain) abnormal. Coordination normal.   Reflex Scores:       Tricep reflexes are 2+ on the right side and 2+ on the left side.       Bicep reflexes are 2+ on the right side and 2+ on the left side.       Brachioradialis reflexes are 2+ on the right side and 2+ on the left side.       Patellar reflexes are 2+ on the right side and 2+ on the left side.       Achilles reflexes are 1+ on the right side and 1+ on the left side.              Skin: Skin is warm and dry.   Psychiatric: He has a normal mood and affect. His behavior is normal. Judgment normal. He expresses no homicidal and no suicidal ideation. He expresses no suicidal plans and no homicidal plans.   Nursing note and vitals reviewed.      Christiano was seen today for pain.    Diagnoses and all orders for this visit:    Chronic foot pain, right    Hx of frostbite    Myofacial muscle pain    Acquired deformities of toe(s), unspecified, right foot    Chronic midline low back pain without sciatica        Medication: None at this time. No opiate medication will be given.  Patient states \"I waited 8 months to see pain management, I need pain medicine \". Explained in great detail history and physical, examination, ancillary physician notes as well as PT notes, and pain management options.  We will try non-opioid interventions, patient will continue with " "compounded cream.  Patient \"seems agitated\" with this decision.  Patient may follow up when necessary for any non-opiate interventions.     Interventional: Pt will continue compounded cream as directed.  I'll discuss interventions for low back pain, patient will call back if he decides upon in use interventions which may include trigger point injections.  Patient will decide upon following up with podiatry, we'll be happy to refer patient to podiatry but he may need primary care to refer patient will decide. GELY and any neurological impairments discussed with patient that may need of emergency evaluation.      Rehab: PT finished and completed- I have read PT notes and discussed with Dr. Graves.     Behavioral: No aberrant behavior noted. PRES[facility] Report # [id number]  was reviewed and is consistent with stated history    Urine drug screen Reviewed from last visit and is appropriate- no Rx in urine.  Patient high risk for opiate management at this present time    I reviewed notes Dr. Deni Graves related to pain management in [place] as below:      \"Medications: The patient did miss a couple of pill counts in the interim and his Presley report reveals noncompliance and breech of contract and I discussed this in detail with the patient and unfortunately I will no longer be able to prescribe pain medication for him due to these contract breaches. A taper prescription was given as below that the patient continues to slowly taper from pain medications\"            This document has been electronically signed by VALERIE Couch on September 5, 2017 1:19 PM          This document has been electronically signed by VALERIE Couch on September 5, 2017 1:19 PM     "

## 2017-11-28 ENCOUNTER — TELEPHONE (OUTPATIENT)
Dept: FAMILY MEDICINE CLINIC | Facility: CLINIC | Age: 32
End: 2017-11-28

## 2017-12-13 DIAGNOSIS — K29.70 GASTRITIS WITHOUT BLEEDING, UNSPECIFIED CHRONICITY, UNSPECIFIED GASTRITIS TYPE: ICD-10-CM

## 2017-12-13 RX ORDER — PANTOPRAZOLE SODIUM 40 MG/1
40 TABLET, DELAYED RELEASE ORAL DAILY
Qty: 30 TABLET | Refills: 5 | Status: SHIPPED | OUTPATIENT
Start: 2017-12-13 | End: 2019-06-19 | Stop reason: SDUPTHER

## 2017-12-15 ENCOUNTER — OFFICE VISIT (OUTPATIENT)
Dept: FAMILY MEDICINE CLINIC | Facility: CLINIC | Age: 32
End: 2017-12-15

## 2017-12-15 VITALS
DIASTOLIC BLOOD PRESSURE: 80 MMHG | HEIGHT: 73 IN | BODY MASS INDEX: 29.61 KG/M2 | SYSTOLIC BLOOD PRESSURE: 124 MMHG | WEIGHT: 223.4 LBS

## 2017-12-15 DIAGNOSIS — F17.200 TOBACCO DEPENDENCE SYNDROME: Chronic | ICD-10-CM

## 2017-12-15 DIAGNOSIS — I10 ESSENTIAL HYPERTENSION: Primary | Chronic | ICD-10-CM

## 2017-12-15 DIAGNOSIS — Z23 NEED FOR IMMUNIZATION AGAINST INFLUENZA: ICD-10-CM

## 2017-12-15 DIAGNOSIS — G89.4 CHRONIC PAIN SYNDROME: Chronic | ICD-10-CM

## 2017-12-15 DIAGNOSIS — M20.61 ACQUIRED DEFORMITIES OF TOE(S), UNSPECIFIED, RIGHT FOOT: ICD-10-CM

## 2017-12-15 DIAGNOSIS — B18.2 CHRONIC HEPATITIS C WITHOUT HEPATIC COMA (HCC): Chronic | ICD-10-CM

## 2017-12-15 PROBLEM — K29.70 GASTRITIS WITHOUT BLEEDING: Chronic | Status: ACTIVE | Noted: 2017-01-03

## 2017-12-15 PROCEDURE — 99214 OFFICE O/P EST MOD 30 MIN: CPT | Performed by: FAMILY MEDICINE

## 2017-12-15 PROCEDURE — 90471 IMMUNIZATION ADMIN: CPT | Performed by: FAMILY MEDICINE

## 2017-12-15 PROCEDURE — 90686 IIV4 VACC NO PRSV 0.5 ML IM: CPT | Performed by: FAMILY MEDICINE

## 2017-12-15 RX ORDER — AMLODIPINE BESYLATE 5 MG/1
5 TABLET ORAL DAILY
Qty: 90 TABLET | Refills: 3 | Status: SHIPPED | OUTPATIENT
Start: 2017-12-15 | End: 2019-04-16 | Stop reason: SDUPTHER

## 2017-12-15 RX ORDER — GABAPENTIN 800 MG/1
TABLET ORAL
Qty: 90 TABLET | Refills: 2 | Status: SHIPPED | OUTPATIENT
Start: 2017-12-15 | End: 2019-06-19 | Stop reason: SDUPTHER

## 2017-12-15 NOTE — PROGRESS NOTES
Subjective   Christiano King is a 32 y.o. male.     History of Present Illness   just a prolonged absence follow-up hypertension chronic hepatitis C chronic leg pain.  Patient been to pain management for pain management did not give narcotics.  Patient is also been to see gastroenterology but gives a vague history of not being given medicine for hepatitis C although there is marked in the chart that a dentist.  Continue the rambling and tangential history.  Is taking blood pressure medicine.  Continues to smoke.  Previous history noted.    The following portions of the patient's history were reviewed and updated as appropriate: allergies, current medications, past family history, past medical history, past social history, past surgical history and problem list.    Review of Systems   Constitutional: Negative for activity change, appetite change, fatigue and unexpected weight change.   HENT: Negative for trouble swallowing and voice change.    Eyes: Negative for redness and visual disturbance.   Respiratory: Negative for cough and wheezing.    Cardiovascular: Negative for chest pain and palpitations.   Gastrointestinal: Negative for abdominal pain, constipation, diarrhea, nausea and vomiting.   Genitourinary: Negative for urgency.   Musculoskeletal: Positive for arthralgias and gait problem. Negative for joint swelling.   Neurological: Negative for syncope and headaches.   Hematological: Negative for adenopathy.   Psychiatric/Behavioral: Negative for sleep disturbance.       Objective   Physical Exam   Constitutional: He appears well-developed.   HENT:   Head: Normocephalic.   Eyes: Pupils are equal, round, and reactive to light.   Neck: Normal range of motion. Neck supple.   Cardiovascular: Normal rate, regular rhythm, normal heart sounds and intact distal pulses.    Pulmonary/Chest: Effort normal.   Abdominal: Soft.   Musculoskeletal: Normal range of motion. He exhibits tenderness (Lower extremity chronic).    Neurological: He is alert. He has normal reflexes.   Skin: Skin is warm.   Psychiatric: His mood appears anxious. His speech is delayed and tangential. He is slowed.       Assessment/Plan   Diagnoses and all orders for this visit:    Essential hypertension  -     amLODIPine (NORVASC) 5 MG tablet; Take 1 tablet by mouth Daily.  -     Urine Drug Screen - Urine, Clean Catch    Tobacco dependence syndrome    Need for immunization against influenza  -     Flu Vaccine Quad PF 3YR+    Chronic pain syndrome  -     gabapentin (NEURONTIN) 800 MG tablet; 1 tid    Chronic hepatitis C without hepatic coma  -     CBC (No Diff)  -     Comprehensive Metabolic Panel  -     Magnesium  -     Urine Drug Screen - Urine, Clean Catch  -     Hepatitis C RNA, Quantitative, PCR (graph)       on stopping smoking.  3-10m     Lab work  getting back with GI.  Counseled following up with all specialist.  Again counseled on no scheduled drugs.  Recheck blood pressure 6 months.

## 2018-02-02 DIAGNOSIS — M54.9 BACK PAIN, UNSPECIFIED BACK LOCATION, UNSPECIFIED BACK PAIN LATERALITY, UNSPECIFIED CHRONICITY: ICD-10-CM

## 2018-02-02 DIAGNOSIS — M54.2 NECK PAIN: Primary | ICD-10-CM

## 2018-02-05 ENCOUNTER — OFFICE VISIT (OUTPATIENT)
Dept: ORTHOPEDIC SURGERY | Facility: CLINIC | Age: 33
End: 2018-02-05

## 2018-02-05 VITALS — WEIGHT: 223 LBS | HEIGHT: 73 IN | BODY MASS INDEX: 29.55 KG/M2

## 2018-02-05 DIAGNOSIS — M54.9 BACK PAIN, UNSPECIFIED BACK LOCATION, UNSPECIFIED BACK PAIN LATERALITY, UNSPECIFIED CHRONICITY: ICD-10-CM

## 2018-02-05 DIAGNOSIS — M54.2 NECK PAIN: Primary | ICD-10-CM

## 2018-02-05 PROCEDURE — 99203 OFFICE O/P NEW LOW 30 MIN: CPT | Performed by: ORTHOPAEDIC SURGERY

## 2018-02-05 RX ORDER — BACLOFEN 10 MG/1
10 TABLET ORAL 3 TIMES DAILY
Qty: 80 TABLET | Refills: 1 | Status: SHIPPED | OUTPATIENT
Start: 2018-02-05 | End: 2019-06-19 | Stop reason: SDDI

## 2018-02-05 NOTE — PROGRESS NOTES
Christiano King is a 32 y.o. male   Primary provider:  Rcih Harrington MD       Chief Complaint   Patient presents with   • Lumbar Spine - Establish Care   • Cervical Spine - Establish Care   • Results     xray done today in office       HISTORY OF PRESENT ILLNESS:    Back Pain   This is a chronic problem. Episode onset: 2013. The problem occurs constantly. The pain is present in the thoracic spine and lumbar spine. The quality of the pain is described as cramping and stabbing. The pain radiates to the right foot. The pain is at a severity of 8/10. The pain is severe. Exacerbated by: increased activity. Stiffness is present all day. Associated symptoms include numbness and tingling. Pertinent negatives include no abdominal pain, chest pain, dysuria, fever, headaches or weakness. He has tried chiropractic manipulation, NSAIDs, muscle relaxant and heat for the symptoms.   Neck Pain    This is a chronic problem. The problem occurs constantly. The problem has been gradually worsening. The quality of the pain is described as aching and stabbing. The pain is at a severity of 8/10. The pain is severe. Associated symptoms include numbness and tingling. Pertinent negatives include no chest pain, fever, headaches, trouble swallowing or weakness. Associated symptoms comments: Arm pain  . He has tried muscle relaxants, oral narcotics and NSAIDs for the symptoms.     Has persistent back pain, the pain has been present for several years.  States the pain is sharp at times.  The pain is in the low back but sometimes extends up his back.  The back pain has been longstanding for several years.  Pain is constant.  The pain does not radiate.  Previously on oxycodone, this was prescibed by pain management physician.   Has been to physical therapy 3 months ago, no improvement.  No fevers, no chills.  Smoker.    CONCURRENT MEDICAL HISTORY:    Past Medical History:   Diagnosis Date   • Acquired deformities of toe(s), unspecified,  right foot    • Chronic pain disorder    • Chronic viral hepatitis C    • Cirrhosis of liver    • Generalized pain     Generalized aches and pains      • Hypertensive disorder    • Pain in right leg    • Right upper quadrant pain    • Tobacco dependence syndrome        Allergies   Allergen Reactions   • Penicillins Swelling     Rx:  Facial swelling         Current Outpatient Prescriptions:   •  amLODIPine (NORVASC) 5 MG tablet, Take 1 tablet by mouth Daily., Disp: 90 tablet, Rfl: 3  •  gabapentin (NEURONTIN) 800 MG tablet, 1 tid, Disp: 90 tablet, Rfl: 2  •  pantoprazole (PROTONIX) 40 MG EC tablet, Take 1 tablet by mouth Daily., Disp: 30 tablet, Rfl: 5    Past Surgical History:   Procedure Laterality Date   • TOE AMPUTATION      PARTIAL AMPUTATION OF TOE 82856 (1)          Family History   Problem Relation Age of Onset   • Diabetes Father    • Diabetes Brother    • Coronary artery disease Brother    • Diabetes Mother    • Hypertension Mother    • Cancer Paternal Aunt        Social History     Social History   • Marital status: Single     Spouse name: N/A   • Number of children: N/A   • Years of education: N/A     Occupational History   • Not on file.     Social History Main Topics   • Smoking status: Current Every Day Smoker   • Smokeless tobacco: Current User     Types: Snuff      Comment: 10/19 cigarettes/day   • Alcohol use Yes      Comment: Beer   • Drug use: No   • Sexual activity: Not on file     Other Topics Concern   • Not on file     Social History Narrative        Review of Systems   Constitutional: Negative for appetite change, chills, diaphoresis, fatigue, fever and unexpected weight change.   HENT: Positive for sore throat. Negative for congestion, dental problem, facial swelling, hearing loss, nosebleeds, postnasal drip, trouble swallowing and voice change.    Eyes: Negative.  Negative for pain, discharge, redness and itching.   Respiratory: Positive for cough and shortness of breath. Negative for  "choking, chest tightness, wheezing and stridor.    Cardiovascular: Negative.  Negative for chest pain, palpitations and leg swelling.   Gastrointestinal: Negative.  Negative for abdominal pain, blood in stool, constipation, diarrhea and nausea.   Endocrine: Negative.  Negative for cold intolerance and heat intolerance.   Genitourinary: Negative.  Negative for dysuria, frequency, hematuria and urgency.   Musculoskeletal: Positive for arthralgias, back pain, gait problem, myalgias and neck pain. Negative for joint swelling and neck stiffness.   Skin: Negative.  Negative for pallor and rash.   Allergic/Immunologic: Negative.    Neurological: Positive for dizziness, tingling and numbness. Negative for syncope, facial asymmetry, speech difficulty, weakness and headaches.   Hematological: Negative.    Psychiatric/Behavioral: Positive for dysphoric mood and sleep disturbance. Negative for agitation, behavioral problems, confusion, decreased concentration and hallucinations. The patient is nervous/anxious. The patient is not hyperactive.    All other systems reviewed and are negative.      PHYSICAL EXAMINATION:       Ht 185.4 cm (73\")  Wt 101 kg (223 lb)  BMI 29.42 kg/m2    Physical Exam   Constitutional: He appears well-developed.   No dentition.   HENT:   Head: Normocephalic.   Mouth/Throat: No oropharyngeal exudate.   Neck: No JVD present. No tracheal deviation present.   Lymphadenopathy:     He has no cervical adenopathy.       GAIT:     []  Normal  []  Antalgic    Assistive device: []  None  []  Walker     []  Crutches  []  Cane     []  Wheelchair  []  Stretcher    Right Ankle Exam     Muscle Strength   Dorsiflexion:  5/5  Plantar flexion:  5/5  Anterior tibial:  5/5  Posterior tibial:  5/5  Gastrocsoleus:  5/5  Peroneal muscle:  5/5      Left Ankle Exam     Muscle Strength   Dorsiflexion:  5/5   Plantar flexion:  5/5   Anterior tibial:  5/5   Posterior tibial:  5/5  Gastrocsoleus:  5/5  Peroneal muscle:  " 5/5      Right Hip Exam     Range of Motion   Internal Rotation: normal   External Rotation: normal     Muscle Strength   Abduction: 5/5   Flexion: 5/5       Left Hip Exam     Range of Motion   Internal Rotation: normal   External Rotation: normal     Muscle Strength   Abduction: 5/5   Flexion: 5/5       Back Exam     Tenderness   The patient is experiencing tenderness in the lumbar and thoracic.    Range of Motion   Extension: 30   Flexion:  90 normal   Lateral Bend Right: 20   Lateral Bend Left: 20   Rotation Right: 30   Rotation Left: 30     Muscle Strength   Right Quadriceps:  5/5   Left Quadriceps:  5/5   Right Hamstrings:  5/5   Left Hamstrings:  5/5     Tests   Straight leg raise right: negative  Straight leg raise left: negative    Reflexes   Patellar: 2/4  Achilles: 2/4  Biceps: 2/4  Babinski's sign: normal     Other   Toe Walk: abnormal  Heel Walk: normal  Sensation: normal  Gait: normal   Erythema: no back redness  Scars: absent    Comments:  Unable to walk on toes right foot due to foot amputation.                    Xr Spine Cervical 2 Or 3 View    Result Date: 2/5/2018  Narrative: Xray cervical spine with standard projection views including the view AP and lateral view.  Bone quality is normal.  Alignment is kyphotic, particularly segmental at C4-5, mild disc space narrowing at C4-5.  Vertebra normal.      Xr Spine Lumbar 2 Or 3 View    Result Date: 2/5/2018  Narrative: Xray lumbar spine with standard projections, the view including AP and lateral view bone quality is good, lumbar alignment is good.  vertebra have concave endplates.  Lumbar disc heights show good height.            ASSESSMENT:    Diagnoses and all orders for this visit:    Neck pain    Back pain, unspecified back location, unspecified back pain laterality, unspecified chronicity          PLAN    MRI ordered of the lumbar spine, prescription given today for lodine and baclofen.  Discussed medications.       Steven Solo,  MD

## 2018-02-14 ENCOUNTER — TELEPHONE (OUTPATIENT)
Dept: GENERAL RADIOLOGY | Facility: HOSPITAL | Age: 33
End: 2018-02-14

## 2019-04-15 DIAGNOSIS — K29.70 GASTRITIS WITHOUT BLEEDING, UNSPECIFIED CHRONICITY, UNSPECIFIED GASTRITIS TYPE: ICD-10-CM

## 2019-04-15 RX ORDER — PANTOPRAZOLE SODIUM 40 MG/1
40 TABLET, DELAYED RELEASE ORAL DAILY
Qty: 30 TABLET | Refills: 5 | OUTPATIENT
Start: 2019-04-15

## 2019-04-15 NOTE — TELEPHONE ENCOUNTER
04/15/2019, 1607 - Patient telephoned per this staff member [phone].  Zero answer.  Voice message submitted with date, time,office contact information, and request to contact office at earliest convenience.      Note - Patient will need to schedule and complete clinical appointment with Dr. Jamal Qureshi M.D. or associate prior to receiving further prescription medication renewals for Pantoprazole 40 MG Tablets, 1 tablet by mouth daily.  Patient's prior clinical appointment with Dr. Jamal Qureshi M.D. 01/03/2017 with patient deemed a No Show for clinical appointment scheduled 06/20/2017, patient canceled clinical appointment scheduled 07/31/2018, and deemed a No Show for clinical appointment scheduled 09/25/20218.  Patient does not currently have a clinical appointment scheduled.

## 2019-04-15 NOTE — TELEPHONE ENCOUNTER
04/15/2019, 1607 - Patient telephoned per this staff member [phone].  Zero answer.  Voice message submitted with date, time,office contact information, and request to contact office at earliest convenience.     Note - Patient will need to schedule and complete clinical appointment with Dr. Jamal Qureshi M.D. or associate prior to receiving further prescription medication renewals for Pantoprazole 40 MG Tablets, 1 tablet by mouth daily.  Patient's prior clinical appointment with Dr. Jamal Qureshi M.D. 01/03/2017 with patient deemed a No Show for clinical appointment scheduled 06/20/2017, patient canceled clinical appointment scheduled 07/31/2018, and deemed a No Show for clinical appointment scheduled 09/25/20218.  Patient does not currently have a clinical appointment scheduled.

## 2019-04-16 DIAGNOSIS — K29.70 GASTRITIS WITHOUT BLEEDING, UNSPECIFIED CHRONICITY, UNSPECIFIED GASTRITIS TYPE: ICD-10-CM

## 2019-04-16 DIAGNOSIS — I10 ESSENTIAL HYPERTENSION: Chronic | ICD-10-CM

## 2019-04-16 RX ORDER — PANTOPRAZOLE SODIUM 40 MG/1
40 TABLET, DELAYED RELEASE ORAL DAILY
Qty: 30 TABLET | Refills: 5 | OUTPATIENT
Start: 2019-04-16

## 2019-04-16 RX ORDER — AMLODIPINE BESYLATE 5 MG/1
5 TABLET ORAL DAILY
Qty: 90 TABLET | Refills: 3 | Status: SHIPPED | OUTPATIENT
Start: 2019-04-16 | End: 2019-06-19 | Stop reason: SDUPTHER

## 2019-04-16 NOTE — TELEPHONE ENCOUNTER
04/15/2019, 1607 - Patient telephoned per this staff member [phone].  Zero answer.  Voice message submitted with date, time,office contact information, and request to contact office at earliest convenience.      Note - Patient will need to schedule and complete clinical appointment with Dr. Jamal Qureshi M.D. or associate prior to receiving further prescription medication renewals for Pantoprazole 40 MG Tablets, 1 tablet by mouth daily.  Patient's prior clinical appointment with Dr. Jamal Qureshi M.D. 01/03/2017 with patient deemed a No Show for clinical appointment scheduled 06/20/2017, patient canceled clinical appointment scheduled 07/31/2018, and deemed a No Show for clinical appointment scheduled 09/25/20218.  Patient does not currently have a clinical appointment scheduled.

## 2019-06-19 ENCOUNTER — OFFICE VISIT (OUTPATIENT)
Dept: FAMILY MEDICINE CLINIC | Facility: CLINIC | Age: 34
End: 2019-06-19

## 2019-06-19 ENCOUNTER — APPOINTMENT (OUTPATIENT)
Dept: LAB | Facility: HOSPITAL | Age: 34
End: 2019-06-19

## 2019-06-19 VITALS
HEIGHT: 73 IN | WEIGHT: 185.5 LBS | DIASTOLIC BLOOD PRESSURE: 98 MMHG | SYSTOLIC BLOOD PRESSURE: 168 MMHG | BODY MASS INDEX: 24.58 KG/M2

## 2019-06-19 DIAGNOSIS — G89.4 CHRONIC PAIN SYNDROME: Chronic | ICD-10-CM

## 2019-06-19 DIAGNOSIS — Z72.0 TOBACCO USE: ICD-10-CM

## 2019-06-19 DIAGNOSIS — B18.2 CHRONIC HEPATITIS C WITHOUT HEPATIC COMA (HCC): Chronic | ICD-10-CM

## 2019-06-19 DIAGNOSIS — I10 ESSENTIAL HYPERTENSION: Primary | Chronic | ICD-10-CM

## 2019-06-19 DIAGNOSIS — K29.70 GASTRITIS WITHOUT BLEEDING, UNSPECIFIED CHRONICITY, UNSPECIFIED GASTRITIS TYPE: Chronic | ICD-10-CM

## 2019-06-19 DIAGNOSIS — M20.61 ACQUIRED DEFORMITIES OF TOE(S), UNSPECIFIED, RIGHT FOOT: ICD-10-CM

## 2019-06-19 DIAGNOSIS — F11.10: ICD-10-CM

## 2019-06-19 DIAGNOSIS — L60.3 NAIL DYSTROPHY: ICD-10-CM

## 2019-06-19 DIAGNOSIS — F17.200 TOBACCO DEPENDENCE SYNDROME: Chronic | ICD-10-CM

## 2019-06-19 LAB
ALBUMIN SERPL-MCNC: 4.1 G/DL (ref 3.5–5.2)
ALBUMIN UR-MCNC: 3.9 MG/L
ALBUMIN/GLOB SERPL: 1.1 G/DL
ALP SERPL-CCNC: 136 U/L (ref 39–117)
ALT SERPL W P-5'-P-CCNC: 118 U/L (ref 1–41)
AMPHET+METHAMPHET UR QL: NEGATIVE
ANION GAP SERPL CALCULATED.3IONS-SCNC: 12.9 MMOL/L
AST SERPL-CCNC: 70 U/L (ref 1–40)
BARBITURATES UR QL SCN: NEGATIVE
BENZODIAZ UR QL SCN: NEGATIVE
BILIRUB SERPL-MCNC: 0.5 MG/DL (ref 0.2–1.2)
BUN BLD-MCNC: 8 MG/DL (ref 6–20)
BUN/CREAT SERPL: 9.9 (ref 7–25)
CALCIUM SPEC-SCNC: 10.3 MG/DL (ref 8.6–10.5)
CANNABINOIDS SERPL QL: NEGATIVE
CHLORIDE SERPL-SCNC: 104 MMOL/L (ref 98–107)
CO2 SERPL-SCNC: 25.1 MMOL/L (ref 22–29)
COCAINE UR QL: NEGATIVE
CREAT BLD-MCNC: 0.81 MG/DL (ref 0.76–1.27)
GFR SERPL CREATININE-BSD FRML MDRD: 108 ML/MIN/1.73
GLOBULIN UR ELPH-MCNC: 3.9 GM/DL
GLUCOSE BLD-MCNC: 92 MG/DL (ref 65–99)
MAGNESIUM SERPL-MCNC: 1.8 MG/DL (ref 1.6–2.6)
METHADONE UR QL SCN: NEGATIVE
OPIATES UR QL: NEGATIVE
OXYCODONE UR QL SCN: NEGATIVE
POTASSIUM BLD-SCNC: 4.4 MMOL/L (ref 3.5–5.2)
PROT SERPL-MCNC: 8 G/DL (ref 6–8.5)
SODIUM BLD-SCNC: 142 MMOL/L (ref 136–145)

## 2019-06-19 PROCEDURE — 83735 ASSAY OF MAGNESIUM: CPT | Performed by: FAMILY MEDICINE

## 2019-06-19 PROCEDURE — 80307 DRUG TEST PRSMV CHEM ANLYZR: CPT | Performed by: FAMILY MEDICINE

## 2019-06-19 PROCEDURE — 80053 COMPREHEN METABOLIC PANEL: CPT | Performed by: FAMILY MEDICINE

## 2019-06-19 PROCEDURE — 99214 OFFICE O/P EST MOD 30 MIN: CPT | Performed by: FAMILY MEDICINE

## 2019-06-19 PROCEDURE — 82043 UR ALBUMIN QUANTITATIVE: CPT | Performed by: FAMILY MEDICINE

## 2019-06-19 PROCEDURE — 36415 COLL VENOUS BLD VENIPUNCTURE: CPT | Performed by: FAMILY MEDICINE

## 2019-06-19 RX ORDER — AMLODIPINE BESYLATE 10 MG/1
10 TABLET ORAL DAILY
Qty: 90 TABLET | Refills: 1 | Status: SHIPPED | OUTPATIENT
Start: 2019-06-19 | End: 2020-01-16 | Stop reason: SDUPTHER

## 2019-06-19 RX ORDER — GABAPENTIN 800 MG/1
TABLET ORAL
Qty: 90 TABLET | Refills: 2 | Status: SHIPPED | OUTPATIENT
Start: 2019-06-19 | End: 2019-09-16 | Stop reason: SDUPTHER

## 2019-06-19 RX ORDER — PANTOPRAZOLE SODIUM 40 MG/1
40 TABLET, DELAYED RELEASE ORAL DAILY
Qty: 90 TABLET | Refills: 1 | Status: SHIPPED | OUTPATIENT
Start: 2019-06-19 | End: 2020-01-16 | Stop reason: SDUPTHER

## 2019-06-19 NOTE — PATIENT INSTRUCTIONS
Steps to Quit Smoking  Smoking tobacco can be harmful to your health and can affect almost every organ in your body. Smoking puts you, and those around you, at risk for developing many serious chronic diseases. Quitting smoking is difficult, but it is one of the best things that you can do for your health. It is never too late to quit.  What are the benefits of quitting smoking?  When you quit smoking, you lower your risk of developing serious diseases and conditions, such as:  · Lung cancer or lung disease, such as COPD.  · Heart disease.  · Stroke.  · Heart attack.  · Infertility.  · Osteoporosis and bone fractures.    Additionally, symptoms such as coughing, wheezing, and shortness of breath may get better when you quit. You may also find that you get sick less often because your body is stronger at fighting off colds and infections. If you are pregnant, quitting smoking can help to reduce your chances of having a baby of low birth weight.  How do I get ready to quit?  When you decide to quit smoking, create a plan to make sure that you are successful. Before you quit:  · Pick a date to quit. Set a date within the next two weeks to give you time to prepare.  · Write down the reasons why you are quitting. Keep this list in places where you will see it often, such as on your bathroom mirror or in your car or wallet.  · Identify the people, places, things, and activities that make you want to smoke (triggers) and avoid them. Make sure to take these actions:  ? Throw away all cigarettes at home, at work, and in your car.  ? Throw away smoking accessories, such as ashtrays and lighters.  ? Clean your car and make sure to empty the ashtray.  ? Clean your home, including curtains and carpets.  · Tell your family, friends, and coworkers that you are quitting. Support from your loved ones can make quitting easier.  · Talk with your health care provider about your options for quitting smoking.  · Find out what  treatment options are covered by your health insurance.    What strategies can I use to quit smoking?  Talk with your healthcare provider about different strategies to quit smoking. Some strategies include:  · Quitting smoking altogether instead of gradually lessening how much you smoke over a period of time. Research shows that quitting “cold turkey” is more successful than gradually quitting.  · Attending in-person counseling to help you build problem-solving skills. You are more likely to have success in quitting if you attend several counseling sessions. Even short sessions of 10 minutes can be effective.  · Finding resources and support systems that can help you to quit smoking and remain smoke-free after you quit. These resources are most helpful when you use them often. They can include:  ? Online chats with a counselor.  ? Telephone quitlines.  ? Printed self-help materials.  ? Support groups or group counseling.  ? Text messaging programs.  ? Mobile phone applications.  · Taking medicines to help you quit smoking. (If you are pregnant or breastfeeding, talk with your health care provider first.) Some medicines contain nicotine and some do not. Both types of medicines help with cravings, but the medicines that include nicotine help to relieve withdrawal symptoms. Your health care provider may recommend:  ? Nicotine patches, gum, or lozenges.  ? Nicotine inhalers or sprays.  ? Non-nicotine medicine that is taken by mouth.    Talk with your health care provider about combining strategies, such as taking medicines while you are also receiving in-person counseling. Using these two strategies together makes you more likely to succeed in quitting than if you used either strategy on its own.  If you are pregnant or breastfeeding, talk with your health care provider about finding counseling or other support strategies to quit smoking. Do not take medicine to help you quit smoking unless told to do so by your health  care provider.  What things can I do to make it easier to quit?  Quitting smoking might feel overwhelming at first, but there is a lot that you can do to make it easier. Take these important actions:  · Reach out to your family and friends and ask that they support and encourage you during this time. Call telephone quitlines, reach out to support groups, or work with a counselor for support.  · Ask people who smoke to avoid smoking around you.  · Avoid places that trigger you to smoke, such as bars, parties, or smoke-break areas at work.  · Spend time around people who do not smoke.  · Lessen stress in your life, because stress can be a smoking trigger for some people. To lessen stress, try:  ? Exercising regularly.  ? Deep-breathing exercises.  ? Yoga.  ? Meditating.  ? Performing a body scan. This involves closing your eyes, scanning your body from head to toe, and noticing which parts of your body are particularly tense. Purposefully relax the muscles in those areas.  · Download or purchase mobile phone or tablet apps (applications) that can help you stick to your quit plan by providing reminders, tips, and encouragement. There are many free apps, such as QuitGuide from the CDC (Centers for Disease Control and Prevention). You can find other support for quitting smoking (smoking cessation) through smokefree.gov and other websites.    How will I feel when I quit smoking?  Within the first 24 hours of quitting smoking, you may start to feel some withdrawal symptoms. These symptoms are usually most noticeable 2-3 days after quitting, but they usually do not last beyond 2-3 weeks. Changes or symptoms that you might experience include:  · Mood swings.  · Restlessness, anxiety, or irritation.  · Difficulty concentrating.  · Dizziness.  · Strong cravings for sugary foods in addition to nicotine.  · Mild weight gain.  · Constipation.  · Nausea.  · Coughing or a sore throat.  · Changes in how your medicines work in your  body.  · A depressed mood.  · Difficulty sleeping (insomnia).    After the first 2-3 weeks of quitting, you may start to notice more positive results, such as:  · Improved sense of smell and taste.  · Decreased coughing and sore throat.  · Slower heart rate.  · Lower blood pressure.  · Clearer skin.  · The ability to breathe more easily.  · Fewer sick days.    Quitting smoking is very challenging for most people. Do not get discouraged if you are not successful the first time. Some people need to make many attempts to quit before they achieve long-term success. Do your best to stick to your quit plan, and talk with your health care provider if you have any questions or concerns.  This information is not intended to replace advice given to you by your health care provider. Make sure you discuss any questions you have with your health care provider.  Document Released: 12/12/2002 Document Revised: 07/24/2018 Document Reviewed: 05/03/2016  [employer] Interactive Patient Education © 2019 [employer]

## 2019-06-19 NOTE — PROGRESS NOTES
Subjective   Christiano King is a 34 y.o. male.     History of Present Illness   presents after absence of 18 months.  Carries diagnoses year and a half ago of chronic foot pain chronic pain syndrome deformities as mentioned previously history of chronic hepatitis C still untreated tobacco abuse history of narcotic abuse.  Up until approximately 2 3 weeks ago was on Suboxone chronically per Eugenio.  Continues with somewhat rambling and mumbling history.  Difficult to get a straight history.  Request back on his Protonix gabapentin and blood pressure medicine.  Have counseled strongly on need for follow-up with his gastroenterologist and need to treating his hepatitis C.  Continues on a self-destructive path most likely.    The following portions of the patient's history were reviewed and updated as appropriate: allergies, current medications, past family history, past medical history, past social history, past surgical history and problem list.    Review of Systems   Constitutional: Negative for activity change, appetite change, fatigue and unexpected weight change.   HENT: Negative for trouble swallowing and voice change.    Eyes: Negative for redness and visual disturbance.   Respiratory: Negative for cough and wheezing.    Cardiovascular: Negative for chest pain and palpitations.   Gastrointestinal: Negative for abdominal pain, constipation, diarrhea, nausea and vomiting.   Genitourinary: Negative for urgency.   Musculoskeletal: Positive for arthralgias. Negative for joint swelling.   Neurological: Negative for syncope and headaches.   Hematological: Negative for adenopathy.   Psychiatric/Behavioral: Negative for sleep disturbance.       Objective   Physical Exam   Constitutional: He is oriented to person, place, and time. He appears well-developed.   HENT:   Head: Normocephalic.   Nose: Nose normal.   Edentulous   Eyes: Pupils are equal, round, and reactive to light.   Neck: Normal range of motion. No  thyromegaly present.   Cardiovascular: Normal rate, regular rhythm, normal heart sounds and intact distal pulses. Exam reveals no gallop and no friction rub.   No murmur heard.  Pulmonary/Chest: Breath sounds normal.   Abdominal: Soft. He exhibits no distension and no mass. There is no tenderness.   Musculoskeletal: Normal range of motion.   Right lower extremity shows a partial amputation of neck and through the fifth toes.  The great toe shows a nailbed deformity with dystrophic nail needs podiatry intervention.  No skin breakdown however.  Left side is clear.   Neurological: He is alert and oriented to person, place, and time. He has normal reflexes.   Skin: Skin is warm and dry.   Psychiatric: His mood appears anxious. His speech is tangential.   Somewhat mumbling rambling history.       Assessment/Plan   Christiano was seen today for hypertension and foot pain.    Diagnoses and all orders for this visit:    Essential hypertension  -     Comprehensive Metabolic Panel  -     MicroAlbumin, Urine, Random - Urine, Clean Catch  -     Urine Drug Screen - Urine, Clean Catch  -     Magnesium  -     amLODIPine (NORVASC) 10 MG tablet; Take 1 tablet by mouth Daily. For bp    Chronic hepatitis C without hepatic coma (CMS/HCC)    Gastritis without bleeding, unspecified chronicity, unspecified gastritis type  -     pantoprazole (PROTONIX) 40 MG EC tablet; Take 1 tablet by mouth Daily.    Chronic pain syndrome  -     gabapentin (NEURONTIN) 800 MG tablet; 1 tid    Tobacco dependence syndrome    Acquired deformities of toe(s), unspecified, right foot    Narcotic abuse, episodic (CMS/HCC)  -     Urine Drug Screen - Urine, Clean Catch    Nail dystrophy  -     Ambulatory Referral to Podiatry    Tobacco use      Counseled on need for close follow-up  lifestyle measures.   on stopping smoking.  3-10m     Lab work as above referred back to podiatry and also to call GI recheck 6 months long-term outlook is  guarded

## 2019-09-16 ENCOUNTER — TELEPHONE (OUTPATIENT)
Dept: FAMILY MEDICINE CLINIC | Facility: CLINIC | Age: 34
End: 2019-09-16

## 2019-09-16 DIAGNOSIS — G89.4 CHRONIC PAIN SYNDROME: Chronic | ICD-10-CM

## 2019-09-16 RX ORDER — GABAPENTIN 800 MG/1
TABLET ORAL
Qty: 180 TABLET | Status: CANCELLED | OUTPATIENT
Start: 2019-09-16

## 2019-09-16 RX ORDER — GABAPENTIN 800 MG/1
TABLET ORAL
Qty: 90 TABLET | Refills: 2 | Status: SHIPPED | OUTPATIENT
Start: 2019-09-16 | End: 2019-09-16 | Stop reason: SDUPTHER

## 2019-09-16 RX ORDER — GABAPENTIN 800 MG/1
TABLET ORAL
Qty: 90 TABLET | Refills: 2 | Status: SHIPPED | OUTPATIENT
Start: 2019-09-16 | End: 2019-12-19 | Stop reason: SDUPTHER

## 2019-09-16 NOTE — TELEPHONE ENCOUNTER
PATIENT STATES THAT HIS gabapentin (NEURONTIN) 800 MG tablet NEEDS TO GO TO [facility]

## 2019-12-01 DIAGNOSIS — G89.4 CHRONIC PAIN SYNDROME: Chronic | ICD-10-CM

## 2019-12-02 RX ORDER — GABAPENTIN 800 MG/1
TABLET ORAL
Qty: 90 TABLET | OUTPATIENT
Start: 2019-12-02

## 2019-12-19 DIAGNOSIS — G89.4 CHRONIC PAIN SYNDROME: Chronic | ICD-10-CM

## 2019-12-19 RX ORDER — GABAPENTIN 800 MG/1
TABLET ORAL
Qty: 90 TABLET | OUTPATIENT
Start: 2019-12-19

## 2019-12-19 RX ORDER — GABAPENTIN 800 MG/1
TABLET ORAL
Qty: 90 TABLET | Refills: 0 | Status: SHIPPED | OUTPATIENT
Start: 2019-12-19 | End: 2020-01-16 | Stop reason: SDUPTHER

## 2020-01-16 ENCOUNTER — APPOINTMENT (OUTPATIENT)
Dept: LAB | Facility: HOSPITAL | Age: 35
End: 2020-01-16

## 2020-01-16 ENCOUNTER — OFFICE VISIT (OUTPATIENT)
Dept: FAMILY MEDICINE CLINIC | Facility: CLINIC | Age: 35
End: 2020-01-16

## 2020-01-16 VITALS
SYSTOLIC BLOOD PRESSURE: 138 MMHG | BODY MASS INDEX: 21.79 KG/M2 | HEIGHT: 73 IN | WEIGHT: 164.4 LBS | DIASTOLIC BLOOD PRESSURE: 90 MMHG

## 2020-01-16 DIAGNOSIS — F11.21 HISTORY OF NARCOTIC ADDICTION (HCC): Chronic | ICD-10-CM

## 2020-01-16 DIAGNOSIS — Z91.199 NONCOMPLIANCE WITH THERAPEUTIC PLAN: ICD-10-CM

## 2020-01-16 DIAGNOSIS — F17.200 TOBACCO DEPENDENCE SYNDROME: Chronic | ICD-10-CM

## 2020-01-16 DIAGNOSIS — I10 ESSENTIAL HYPERTENSION: Primary | Chronic | ICD-10-CM

## 2020-01-16 DIAGNOSIS — K29.70 GASTRITIS WITHOUT BLEEDING, UNSPECIFIED CHRONICITY, UNSPECIFIED GASTRITIS TYPE: Chronic | ICD-10-CM

## 2020-01-16 DIAGNOSIS — B18.2 CHRONIC HEPATITIS C WITHOUT HEPATIC COMA (HCC): Chronic | ICD-10-CM

## 2020-01-16 DIAGNOSIS — M20.61 ACQUIRED DEFORMITIES OF TOE(S), UNSPECIFIED, RIGHT FOOT: ICD-10-CM

## 2020-01-16 DIAGNOSIS — G89.4 CHRONIC PAIN SYNDROME: Chronic | ICD-10-CM

## 2020-01-16 PROCEDURE — 96372 THER/PROPH/DIAG INJ SC/IM: CPT | Performed by: FAMILY MEDICINE

## 2020-01-16 PROCEDURE — 99214 OFFICE O/P EST MOD 30 MIN: CPT | Performed by: FAMILY MEDICINE

## 2020-01-16 RX ORDER — GABAPENTIN 800 MG/1
TABLET ORAL
Qty: 90 TABLET | Refills: 2 | Status: SHIPPED | OUTPATIENT
Start: 2020-01-16 | End: 2020-04-13 | Stop reason: SDUPTHER

## 2020-01-16 RX ORDER — PANTOPRAZOLE SODIUM 40 MG/1
40 TABLET, DELAYED RELEASE ORAL DAILY
Qty: 90 TABLET | Refills: 1 | Status: SHIPPED | OUTPATIENT
Start: 2020-01-16 | End: 2020-03-17 | Stop reason: DRUGHIGH

## 2020-01-16 RX ORDER — TRIAMCINOLONE ACETONIDE 40 MG/ML
40 INJECTION, SUSPENSION INTRA-ARTICULAR; INTRAMUSCULAR ONCE
Status: COMPLETED | OUTPATIENT
Start: 2020-01-16 | End: 2020-01-16

## 2020-01-16 RX ORDER — AMLODIPINE BESYLATE 10 MG/1
10 TABLET ORAL DAILY
Qty: 90 TABLET | Refills: 1 | Status: SHIPPED | OUTPATIENT
Start: 2020-01-16 | End: 2020-03-17 | Stop reason: DRUGHIGH

## 2020-01-16 RX ADMIN — TRIAMCINOLONE ACETONIDE 40 MG: 40 INJECTION, SUSPENSION INTRA-ARTICULAR; INTRAMUSCULAR at 10:22

## 2020-01-16 NOTE — PROGRESS NOTES
Subjective   Christiano King is a 34 y.o. male.     History of Present Illness   reevaluation hypertension chronic foot pain neck pain history of narcotic abuse back abuse etc.  In interim did not keep appointment with podiatry as outlined.  Also apparently has been discharged from pain management for some reason.  Continues to request pain medicine this course is denied counseled gabapentin to be all we be getting for primary care.  Eugenio does reflect only gabapentin for the past several months.  Was supposed be on Suboxone but apparently stopped it.  Continues rambling mumbling history.  Suspect some cognitive deficit related to chronic drug use.  Requesting pain shot.  HPI    The following portions of the patient's history were reviewed and updated as appropriate: allergies, current medications, past family history, past medical history, past social history, past surgical history and problem list.    Review of Systems  Review of Systems   Constitutional: Negative for activity change, appetite change, fatigue and unexpected weight change.   HENT: Negative for trouble swallowing and voice change.    Eyes: Negative for redness and visual disturbance.   Respiratory: Negative for cough and wheezing.    Cardiovascular: Negative for chest pain and palpitations.   Gastrointestinal: Negative for abdominal pain, constipation, diarrhea, nausea and vomiting.   Genitourinary: Negative for urgency.   Musculoskeletal: Positive for arthralgias, back pain, neck pain and neck stiffness. Negative for joint swelling.   Neurological: Negative for syncope and headaches.   Hematological: Negative for adenopathy.   Psychiatric/Behavioral: Positive for decreased concentration. Negative for sleep disturbance.       Objective   Physical Exam  Physical Exam   Constitutional: He is oriented to person, place, and time. He appears well-developed.   HENT:   Head: Normocephalic.   Nose: Nose normal.   Mouth/Throat: Oropharynx is clear  "and moist.   Eyes: Pupils are equal, round, and reactive to light.   Neck: Normal range of motion. No thyromegaly present.   Cardiovascular: Normal rate, regular rhythm, normal heart sounds and intact distal pulses. Exam reveals no gallop and no friction rub.   No murmur heard.  Pulmonary/Chest: Breath sounds normal.   Abdominal: Soft. He exhibits no distension and no mass. There is no tenderness.   Musculoskeletal: Normal range of motion.   Left foot continues to be deformed no new changes previous history noted   Neurological: He is alert and oriented to person, place, and time. He has normal reflexes.   Skin: Skin is warm and dry.   Psychiatric: His affect is blunt. His speech is delayed and tangential. He is slowed.   Again rambling rumble mumbling history.  History is noted.         Visit Vitals  /90   Ht 185.4 cm (73\")   Wt 74.6 kg (164 lb 6.4 oz)   BMI 21.69 kg/m²     Body mass index is 21.69 kg/m².      Assessment/Plan   Christiano was seen today for hypertension.    Diagnoses and all orders for this visit:    Essential hypertension  -     amLODIPine (NORVASC) 10 MG tablet; Take 1 tablet by mouth Daily. For bp    Chronic hepatitis C without hepatic coma (CMS/HCC)  -     Ambulatory Referral to Gastroenterology    Chronic pain syndrome  -     triamcinolone acetonide (KENALOG-40) injection 40 mg  -     Ambulatory Referral to Podiatry  -     Ambulatory Referral to Pain Management  -     gabapentin (NEURONTIN) 800 MG tablet; 1 tid    Tobacco dependence syndrome    Acquired deformities of toe(s), unspecified, right foot  -     triamcinolone acetonide (KENALOG-40) injection 40 mg  -     Ambulatory Referral to Podiatry  -     Ambulatory Referral to Pain Management    History of narcotic addiction (CMS/HCC)    Gastritis without bleeding, unspecified chronicity, unspecified gastritis type  -     pantoprazole (PROTONIX) 40 MG EC tablet; Take 1 tablet by mouth Daily.    Noncompliance with therapeutic plan     " given not be getting any narcotics from primary care.  Referred to another pain management.  Referred back to podiatry also back to GI medicine since he did not follow-up there as well for hepatitis C treatment.  Continue self-destructive path.   on stopping smoking.  3-10m     Recheck 6 months

## 2020-01-21 DIAGNOSIS — B18.2 CHRONIC HEPATITIS C WITHOUT HEPATIC COMA (HCC): Primary | Chronic | ICD-10-CM

## 2020-02-06 ENCOUNTER — TELEPHONE (OUTPATIENT)
Dept: FAMILY MEDICINE CLINIC | Facility: CLINIC | Age: 35
End: 2020-02-06

## 2020-02-06 NOTE — TELEPHONE ENCOUNTER
PT CALLED AND STATES HE IS TO LET DR CONTRERAS KNOW THAT HE WILL BE HAVING HIS LABS ON 2/13/20. AND HE ALSO HAS AN APPT WITH DR KLEIN ON 2/18/20. HE STATES THAT DR CONTRERAS TOLD HIM HE HAD TO GET THIS DONE IN ORDER TO GET A REFERRAL TO PAIN MANAGEMENT?? IF ANY QUESTIONS CALL HIM [phone]

## 2020-02-13 ENCOUNTER — LAB (OUTPATIENT)
Dept: LAB | Facility: OTHER | Age: 35
End: 2020-02-13

## 2020-02-13 DIAGNOSIS — B18.2 CHRONIC HEPATITIS C WITHOUT HEPATIC COMA (HCC): Chronic | ICD-10-CM

## 2020-02-13 PROCEDURE — 36415 COLL VENOUS BLD VENIPUNCTURE: CPT | Performed by: FAMILY MEDICINE

## 2020-02-13 PROCEDURE — 87522 HEPATITIS C REVRS TRNSCRPJ: CPT | Performed by: FAMILY MEDICINE

## 2020-02-17 LAB
HCV RNA SERPL NAA+PROBE-ACNC: NORMAL IU/ML
HCV RNA SERPL NAA+PROBE-LOG IU: 5.98 LOG10 IU/ML
TEST INFORMATION: NORMAL

## 2020-02-18 ENCOUNTER — OFFICE VISIT (OUTPATIENT)
Dept: GASTROENTEROLOGY | Facility: CLINIC | Age: 35
End: 2020-02-18

## 2020-02-18 VITALS
HEART RATE: 89 BPM | WEIGHT: 176 LBS | HEIGHT: 73 IN | BODY MASS INDEX: 23.33 KG/M2 | SYSTOLIC BLOOD PRESSURE: 142 MMHG | DIASTOLIC BLOOD PRESSURE: 80 MMHG

## 2020-02-18 DIAGNOSIS — B18.2 CHRONIC HEPATITIS C WITHOUT HEPATIC COMA (HCC): Primary | ICD-10-CM

## 2020-02-18 DIAGNOSIS — R74.8 ELEVATED LIVER ENZYMES: ICD-10-CM

## 2020-02-18 DIAGNOSIS — K74.00 HEPATIC FIBROSIS: ICD-10-CM

## 2020-02-18 PROCEDURE — 99214 OFFICE O/P EST MOD 30 MIN: CPT | Performed by: PHYSICIAN ASSISTANT

## 2020-02-18 RX ORDER — AMLODIPINE BESYLATE 5 MG/1
5 TABLET ORAL DAILY
COMMUNITY
Start: 2020-02-12 | End: 2020-07-27 | Stop reason: SDUPTHER
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
## 2020-02-18 NOTE — PATIENT INSTRUCTIONS

## 2020-02-20 ENCOUNTER — TELEPHONE (OUTPATIENT)
Dept: FAMILY MEDICINE CLINIC | Facility: CLINIC | Age: 35
End: 2020-02-20

## 2020-02-20 NOTE — TELEPHONE ENCOUNTER
PT CALLED AND STATES THAT HE HAS DONE EVERYTHING DR CONTRERAS ASKED. HE HAD HIS LABS AND HE HAS BEEN TO SEE DR KLEIN. HE NEEDS TO KNOW IF DR CONTRERAS WILL BE REFERRING HIM TO PAIN MANAGEMENT NOW? HE STATES HE WILL HAVE TO CALL BACK BECAUSE HE DOESN'T HAVE A WORKING PHONE AT THIS POINT FOR YOU TO CALL HIM BACK.

## 2020-02-24 ENCOUNTER — TELEPHONE (OUTPATIENT)
Dept: FAMILY MEDICINE CLINIC | Facility: CLINIC | Age: 35
End: 2020-02-24

## 2020-03-10 LAB
ALPHA-FETOPROTEIN: 12.11 NG/ML (ref 0–8.3)
ALPHA1 GLOB MFR UR ELPH: 133 MG/DL (ref 90–200)
AMPHET+METHAMPHET UR QL: NEGATIVE
AMPHETAMINES UR QL: POSITIVE
BARBITURATES UR QL SCN: NEGATIVE
BENZODIAZ UR QL SCN: NEGATIVE
BUPRENORPHINE SERPL-MCNC: NEGATIVE NG/ML
CANNABINOIDS SERPL QL: NEGATIVE
CERULOPLASMIN SERPL-MCNC: 23 MG/DL (ref 16–31)
COCAINE UR QL: NEGATIVE
FERRITIN SERPL-MCNC: 239 NG/ML (ref 30–400)
HBV SURFACE AG SERPL QL IA: NORMAL
HOLD SPECIMEN: NORMAL
INR PPP: 1.18 (ref 0.8–1.2)
IRON 24H UR-MRATE: 85 MCG/DL (ref 59–158)
IRON SATN MFR SERPL: 24 % (ref 20–50)
METHADONE UR QL SCN: NEGATIVE
OPIATES UR QL: NEGATIVE
OXYCODONE UR QL SCN: NEGATIVE
PCP UR QL SCN: NEGATIVE
PROPOXYPH UR QL: NEGATIVE
PROTHROMBIN TIME: 14.7 SECONDS (ref 11.1–15.3)
TIBC SERPL-MCNC: 356 MCG/DL (ref 298–536)
TRANSFERRIN SERPL-MCNC: 239 MG/DL (ref 200–360)
TRICYCLICS UR QL SCN: NEGATIVE

## 2020-03-10 PROCEDURE — 84466 ASSAY OF TRANSFERRIN: CPT | Performed by: PHYSICIAN ASSISTANT

## 2020-03-10 PROCEDURE — 81596 NFCT DS CHRNC HCV 6 ASSAYS: CPT | Performed by: PHYSICIAN ASSISTANT

## 2020-03-10 PROCEDURE — 87902 NFCT AGT GNTYP ALYS HEP C: CPT | Performed by: PHYSICIAN ASSISTANT

## 2020-03-10 PROCEDURE — 82103 ALPHA-1-ANTITRYPSIN TOTAL: CPT | Performed by: PHYSICIAN ASSISTANT

## 2020-03-10 PROCEDURE — 82390 ASSAY OF CERULOPLASMIN: CPT | Performed by: PHYSICIAN ASSISTANT

## 2020-03-10 PROCEDURE — 82105 ALPHA-FETOPROTEIN SERUM: CPT | Performed by: PHYSICIAN ASSISTANT

## 2020-03-10 PROCEDURE — 86704 HEP B CORE ANTIBODY TOTAL: CPT | Performed by: PHYSICIAN ASSISTANT

## 2020-03-10 PROCEDURE — 83516 IMMUNOASSAY NONANTIBODY: CPT | Performed by: PHYSICIAN ASSISTANT

## 2020-03-10 PROCEDURE — 86038 ANTINUCLEAR ANTIBODIES: CPT | Performed by: PHYSICIAN ASSISTANT

## 2020-03-10 PROCEDURE — 83540 ASSAY OF IRON: CPT | Performed by: PHYSICIAN ASSISTANT

## 2020-03-10 PROCEDURE — 86317 IMMUNOASSAY INFECTIOUS AGENT: CPT | Performed by: PHYSICIAN ASSISTANT

## 2020-03-10 PROCEDURE — 87340 HEPATITIS B SURFACE AG IA: CPT | Performed by: PHYSICIAN ASSISTANT

## 2020-03-10 PROCEDURE — 80306 DRUG TEST PRSMV INSTRMNT: CPT | Performed by: PHYSICIAN ASSISTANT

## 2020-03-10 PROCEDURE — 82728 ASSAY OF FERRITIN: CPT | Performed by: PHYSICIAN ASSISTANT

## 2020-03-10 PROCEDURE — 86707 HEPATITIS BE ANTIBODY: CPT | Performed by: PHYSICIAN ASSISTANT

## 2020-03-10 PROCEDURE — 85610 PROTHROMBIN TIME: CPT | Performed by: INTERNAL MEDICINE

## 2020-03-11 LAB
ACTIN IGG SERPL-ACNC: 6 UNITS (ref 0–19)
DEPRECATED MITOCHONDRIA M2 IGG SER-ACNC: <20 UNITS (ref 0–20)
HBV CORE AB SER DONR QL IA: NEGATIVE
HBV E AB SERPL QL IA: NEGATIVE
HBV SURFACE AB SER-ACNC: 34.3 MIU/ML

## 2020-03-12 LAB — ANA SER QL IA: NEGATIVE

## 2020-03-13 LAB
A2 MACROGLOB SERPL-MCNC: 269 MG/DL (ref 110–276)
ALT SERPL W P-5'-P-CCNC: 88 IU/L (ref 0–55)
APO A-I SERPL-MCNC: 106 MG/DL (ref 101–178)
BILIRUB SERPL-MCNC: 0.4 MG/DL (ref 0–1.2)
FIBROSIS SCORING:: ABNORMAL
FIBROSIS STAGE SERPL QL: ABNORMAL
GGT SERPL-CCNC: 96 IU/L (ref 0–65)
HAPTOGLOB SERPL-MCNC: 30 MG/DL (ref 17–317)
HCV AB SER QL: ABNORMAL
HCV GENTYP SERPL NAA+PROBE: NORMAL
LABORATORY COMMENT REPORT: ABNORMAL
LIMITATIONS:: ABNORMAL
LIVER FIBR SCORE SERPL CALC.FIBROSURE: 0.65 (ref 0–0.21)
Lab: NORMAL
NECROINFLAMM ACTIVITY SCORING:: ABNORMAL
NECROINFLAMMATORY ACT GRADE SERPL QL: ABNORMAL
NECROINFLAMMATORY ACT SCORE SERPL: 0.64 (ref 0–0.17)

## 2020-03-17 ENCOUNTER — OFFICE VISIT (OUTPATIENT)
Dept: GASTROENTEROLOGY | Facility: CLINIC | Age: 35
End: 2020-03-17

## 2020-03-17 VITALS
SYSTOLIC BLOOD PRESSURE: 140 MMHG | BODY MASS INDEX: 23.72 KG/M2 | HEIGHT: 73 IN | HEART RATE: 97 BPM | DIASTOLIC BLOOD PRESSURE: 80 MMHG | OXYGEN SATURATION: 99 % | WEIGHT: 179 LBS

## 2020-03-17 DIAGNOSIS — K74.00 HEPATIC FIBROSIS: ICD-10-CM

## 2020-03-17 DIAGNOSIS — R74.8 ELEVATED LIVER ENZYMES: ICD-10-CM

## 2020-03-17 DIAGNOSIS — B18.2 CHRONIC HEPATITIS C WITHOUT HEPATIC COMA (HCC): Primary | ICD-10-CM

## 2020-03-17 PROCEDURE — 99214 OFFICE O/P EST MOD 30 MIN: CPT | Performed by: PHYSICIAN ASSISTANT

## 2020-03-17 RX ORDER — LEDIPASVIR AND SOFOSBUVIR 90; 400 MG/1; MG/1
1 TABLET, FILM COATED ORAL DAILY
Qty: 28 TABLET | Refills: 1 | OUTPATIENT
Start: 2020-03-17 | End: 2020-11-11 | Stop reason: RX

## 2020-03-17 NOTE — PROGRESS NOTES
Chief Complaint   Patient presents with   • Hepatitis C   • Elevated Hepatic Enzymes   • Hepatic fibrosis       ENDO PROCEDURE ORDERED:    Subjective    Christiano King is a 34 y.o. male. he is here today for follow-up.    History of Present Illness    Patient seen on a recheck of his chronic active hepatitis C, elevated liver enzymes, hepatic fibrosis and GERD.  Last seen 02/18/2020.  Patient is on Protonix 40 mg daily for chronic heartburn.  Denied nausea or vomiting.  Bowels are moving without blood or mucus.  Weight is up 3 pounds since last visit.  He has never had a colonoscopy.  He denies drinking any alcohol for the past several years.      Patient was taken by ambulance to the ER in [place] for increased anxiety and possible mental status changes.  He had had a negative right foot x-ray.  A urinalysis showed some blood.  A CKMB was normal.  A CMP showed an AST of 59, ALT 82, otherwise normal.  CBC showed 86,000 platelets.    Patient had studies on 03/10/2020.  Abdominal ultrasound showed a nodular liver, suggestive of cirrhosis, with a 3 mm common bile duct, and 2 stones in the right kidney with an enlarged spleen.  Urine drug screen positive for meth.  INR 1.18. CARI, AMA, hepatitis B surface antigen, hepatitis B envelope antibody and hepatitis B core antibody total were all normal or negative. A hepatitis B surface antibody quantitation showed immunity.  HCV FibroSure 0.65/F3, 0.64/A3.  GGT 96, ALT 88.  Normal ceruloplasmin, SMA, and alpha-1 antitrypsin.  He was a genotype 1A.  AFP was increased at 12.11.  Normal iron with ferritin as well.     ASSESSMENT AND PLAN:  Patient with significant hepatic fibrosis with chronic active hepatitis C.  I suspect his liver disease is more advanced than his studies are suggesting.  We will try to get approval for 8 weeks of Harvoni or Mavyret.  He agrees not to use drugs or alcohol during treatment.  He agrees to be complaint with medication protocol and get  his laboratories as recommended.  He understands the importance of keeping his appointments.  We will need to decrease his Protonix to 20 mg daily once he starts medication.  We will plan follow up at 1 month.  Further pending clinical course and the results of the above.  Patient did want me to complete some paperwork for his food stamps, but I told him based on his liver disease I could not say he was disabled and would meet their criteria. I suggested he see Rich Harrington MD, his primary care to see if he could help him.         The following portions of the patient's history were reviewed and updated as appropriate:   Past Medical History:   Diagnosis Date   • Acquired deformities of toe(s), unspecified, right foot    • Chronic pain disorder    • Chronic viral hepatitis C (CMS/HCC)    • Cirrhosis of liver (CMS/HCC)    • Generalized pain     Generalized aches and pains      • Hypertensive disorder    • Pain in right leg    • Right upper quadrant pain    • Tobacco dependence syndrome      Past Surgical History:   Procedure Laterality Date   • TOE AMPUTATION      PARTIAL AMPUTATION OF TOE 49046 (1)        Family History   Problem Relation Age of Onset   • Diabetes Father    • Diabetes Brother    • Coronary artery disease Brother    • Diabetes Mother    • Hypertension Mother    • Cancer Paternal Aunt        Allergies   Allergen Reactions   • Penicillins Swelling     Rx:  Facial swelling     Social History     Socioeconomic History   • Marital status: Single     Spouse name: Not on file   • Number of children: Not on file   • Years of education: Not on file   • Highest education level: Not on file   Tobacco Use   • Smoking status: Current Every Day Smoker     Types: Cigarettes   • Smokeless tobacco: Current User     Types: Snuff   • Tobacco comment: 10/19 cigarettes/day   Substance and Sexual Activity   • Alcohol use: Yes     Comment: Beer   • Drug use: No     Current Medications:  Prior to Admission medications   "  Medication Sig Start Date End Date Taking? Authorizing Provider   amLODIPine (NORVASC) 5 MG tablet Take 5 mg by mouth Daily. 2/12/20  Yes ProviderUriel MD   gabapentin (NEURONTIN) 800 MG tablet 1 tid 1/16/20  Yes Rich Harrington MD   pantoprazole (PROTONIX) 40 MG EC tablet Take 1 tablet by mouth Daily. 1/16/20  Yes Rich Harrington MD   amLODIPine (NORVASC) 10 MG tablet Take 1 tablet by mouth Daily. For bp 1/16/20   Rich Harrington MD     Review of Systems  Review of Systems       Objective    /80 (BP Location: Left arm, Patient Position: Sitting)   Pulse 97   Ht 185.4 cm (73\")   Wt 81.2 kg (179 lb)   SpO2 99%   BMI 23.62 kg/m²   Physical Exam   Constitutional: He is oriented to person, place, and time. He appears well-developed and well-nourished. No distress.   HENT:   Head: Normocephalic and atraumatic.   Eyes: Pupils are equal, round, and reactive to light. EOM are normal.   Neck: Normal range of motion.   Cardiovascular: Normal rate, regular rhythm and normal heart sounds.   Pulmonary/Chest: Effort normal and breath sounds normal.   Abdominal: Soft. Bowel sounds are normal. He exhibits no shifting dullness, no distension, no abdominal bruit, no ascites and no mass. There is no hepatosplenomegaly. There is tenderness. There is no rigidity, no rebound, no guarding and no CVA tenderness. No hernia. Hernia confirmed negative in the ventral area.   Musculoskeletal: Normal range of motion.   Neurological: He is alert and oriented to person, place, and time.   Skin: Skin is warm and dry.   Psychiatric: He has a normal mood and affect. His behavior is normal. Judgment and thought content normal.   Nursing note and vitals reviewed.    Assessment/Plan      1. Chronic hepatitis C without hepatic coma (CMS/HCC)    2. Elevated liver enzymes    3. Hepatic fibrosis    .   Christiano was seen today for hepatitis c, elevated hepatic enzymes and hepatic fibrosis.    Diagnoses and all orders for this " visit:    Chronic hepatitis C without hepatic coma (CMS/HCC)    Elevated liver enzymes    Hepatic fibrosis  Comments:  F3    Other orders  -     Ledipasvir-Sofosbuvir  MG tablet; Take 1 tablet by mouth Daily.        Orders placed during this encounter include:  No orders of the defined types were placed in this encounter.      Medications prescribed:  New Medications Ordered This Visit   Medications   • Ledipasvir-Sofosbuvir  MG tablet     Sig: Take 1 tablet by mouth Daily.     Dispense:  28 tablet     Refill:  1       Requested Prescriptions     Signed Prescriptions Disp Refills   • Ledipasvir-Sofosbuvir  MG tablet 28 tablet 1     Sig: Take 1 tablet by mouth Daily.       Review and/or summary of lab tests, radiology, procedures, medications. Review and summary of old records and obtaining of history. The risks and benefits of my recommendations, as well as other treatment options were discussed with the patient today. Questions were answered.    Follow-up: Return in about 1 month (around 4/17/2020), or if symptoms worsen or fail to improve.     * Surgery not found *      This document has been electronically signed by Travis Haywood PA-C on March 20, 2020 14:20      Results for orders placed or performed in visit on 02/18/20   Hep B Confirmation Tube   Result Value Ref Range    Extra Tube Hold for add-ons.    Hepatitis B Surface Antigen   Result Value Ref Range    Hepatitis B Surface Ag Non-Reactive Non-Reactive   HCV FibroSURE   Result Value Ref Range    Fibrosis Score 0.65 (H) 0.00 - 0.21    Fibrosis Stage Comment     Necroinflammat Activity Score 0.64 (H) 0.00 - 0.17    Necroinflammat Activity Grade A3-Severe activity     Alpha 2-Macroglobulins, Qn 269 110 - 276 mg/dL    Haptoglobin 30 17 - 317 mg/dL    Apolipoprotein A-1 106 101 - 178 mg/dL    Total Bilirubin 0.4 0.0 - 1.2 mg/dL    GGT 96 (H) 0 - 65 IU/L    ALT (SGPT) 88 (H) 0 - 55 IU/L    HCV Qual Interp Comment     Fibrosis Scoring:  Comment     Necroinflamm Activity Scoring: Comment     Limitations: Comment     Comment Comment    Hepatitis B Surf Antibody Quant   Result Value Ref Range    Hepatitis B Surface Ab Quant 34.3 Immunity>9.9 mIU/mL   Nuclear Antigen Antibody, IFA   Result Value Ref Range    CARI Negative    Iron and TIBC   Result Value Ref Range    Iron 85 59 - 158 mcg/dL    Iron Saturation 24 20 - 50 %    Transferrin 239 200 - 360 mg/dL    TIBC 356 298 - 536 mcg/dL   Alpha - 1 - Antitrypsin   Result Value Ref Range    ALPHA -1 ANTITRYPSIN 133 90 - 200 mg/dL   Hepatitis B E Antibody   Result Value Ref Range    Hep B E Ab Negative Negative   Mitochondrial Antibodies, M2   Result Value Ref Range    Mitochondrial Ab <20.0 0.0 - 20.0 Units   Ceruloplasmin   Result Value Ref Range    Ceruloplasmin 23 16 - 31 mg/dL   Urine Drug Screen - Urine, Clean Catch   Result Value Ref Range    THC, Screen, Urine Negative Negative    Phencyclidine (PCP), Urine Negative Negative    Cocaine Screen, Urine Negative Negative    Methamphetamine, Ur Positive (A) Negative    Opiate Screen Negative Negative    Amphetamine Screen, Urine Negative Negative    Benzodiazepine Screen, Urine Negative Negative    Tricyclic Antidepressants Screen Negative Negative    Methadone Screen, Urine Negative Negative    Barbiturates Screen, Urine Negative Negative    Oxycodone Screen, Urine Negative Negative    Propoxyphene Screen Negative Negative    Buprenorphine, Screen, Urine Negative Negative   AFP Tumor Marker   Result Value Ref Range    ALPHA-FETOPROTEIN 12.11 (H) 0 - 8.3 ng/mL   Hepatitis B Core Antibody, Total   Result Value Ref Range    Hep B Core Total Ab Negative Negative   Hepatitis C Genotype   Result Value Ref Range    Please note Comment     Hepatitis C Genotype 1a    Anti-Smooth Muscle Antibody Titer   Result Value Ref Range    Smooth Muscle Ab 6 0 - 19 Units   Protime-INR   Result Value Ref Range    Protime 14.7 11.1 - 15.3 Seconds    INR 1.18 0.80 - 1.20    Ferritin   Result Value Ref Range    Ferritin 239.00 30.00 - 400.00 ng/mL   Results for orders placed or performed in visit on 02/13/20   Hepatitis C RNA, Quantitative, PCR (graph)   Result Value Ref Range    Hepatitis C Quantitation 535442 IU/mL    HCV log10 5.978 log10 IU/mL    Test Information Comment    Results for orders placed or performed in visit on 06/19/19   Urine Drug Screen - Urine, Clean Catch   Result Value Ref Range    Amphet/Methamphet, Screen Negative Negative    Barbiturates Screen, Urine Negative Negative    Benzodiazepine Screen, Urine Negative Negative    Cocaine Screen, Urine Negative Negative    Opiate Screen Negative Negative    THC, Screen, Urine Negative Negative    Methadone Screen, Urine Negative Negative    Oxycodone Screen, Urine Negative Negative   MicroAlbumin, Urine, Random - Urine, Clean Catch   Result Value Ref Range    Microalbumin, Urine 3.9 mg/L   Magnesium   Result Value Ref Range    Magnesium 1.8 1.6 - 2.6 mg/dL   Comprehensive Metabolic Panel   Result Value Ref Range    Glucose 92 65 - 99 mg/dL    BUN 8 6 - 20 mg/dL    Creatinine 0.81 0.76 - 1.27 mg/dL    Sodium 142 136 - 145 mmol/L    Potassium 4.4 3.5 - 5.2 mmol/L    Chloride 104 98 - 107 mmol/L    CO2 25.1 22.0 - 29.0 mmol/L    Calcium 10.3 8.6 - 10.5 mg/dL    Total Protein 8.0 6.0 - 8.5 g/dL    Albumin 4.10 3.50 - 5.20 g/dL    ALT (SGPT) 118 (H) 1 - 41 U/L    AST (SGOT) 70 (H) 1 - 40 U/L    Alkaline Phosphatase 136 (H) 39 - 117 U/L    Total Bilirubin 0.5 0.2 - 1.2 mg/dL    eGFR Non African Amer 108 >60 mL/min/1.73    Globulin 3.9 gm/dL    A/G Ratio 1.1 g/dL    BUN/Creatinine Ratio 9.9 7.0 - 25.0    Anion Gap 12.9 mmol/L   Results for orders placed or performed in visit on 07/12/17   ToxASSURE Select 13 (MW)   Result Value Ref Range    Report Summary FINAL    Results for orders placed or performed during the hospital encounter of 12/27/16   Urine Drug Screen   Result Value Ref Range    Barbiturates Screen, Urine Negative  Negative    Benzodiazepine Screen, Urine POSITIVE (H) Negative    Opiate Screen, Urine Negative Negative    THC Screen Interpretation Negative Negative    Amphet/Methamphet, Screen, Urine Negative Negative    Cocaine Screen, Urine Negative Negative    Oxycodone Screen, Urine Negative Negative    Methadone Screen, Urine Negative Negative   AFP Tumor Marker   Result Value Ref Range    AFP Tumor Marker 14.1 (H) 0.0 - 8.3 ng/mL   Ethanol   Result Value Ref Range    Ethanol mg/dL 0 0.00 - 0.00 gm/dl   Results for orders placed or performed during the hospital encounter of 12/27/16   HCV GENOSURE (R) NS3 / 4A   Result Value Ref Range    HCV GenoSure(R) NS3/4A PDF .     HCV GenoSure(R) NS3/4A Comment     HCV GenoSure(R) NS3/4A Interp Comment    ARMSTRONG Fibrosure   Result Value Ref Range    Alpha 2-Macroglobulins, Qn 271 110 - 276 mg/dL    Haptoglobin 36 34 - 200 mg/dL    Apolipoprotein A-1 129 101 - 178 mg/dL    Total Bilirubin 0.6 0.0 - 1.2 mg/dL     (H) 0 - 65 IU/L    ALT (SGPT) 159 (H) 0 - 55 IU/L    Fibrosis Scoring: Comment     Fibrosis Score 0.72 (H) 0.00 - 0.21    Fibrosis Stage Comment     Steatosis Grading 0.90Comment (H) 0.00 - 0.30    ARMSTRONG Scoring Comment     Patient Height 73 Inches    Weight 251 LBS    AST (SGOT) 161 (H) 0 - 40 IU/L    Total Cholesterol 210 (H) 100 - 199 mg/dL    Glucose 142 (H) 65 - 99 mg/dL    Triglycerides 261 (H) 0 - 149 mg/dL    Interpretation Comment     Steatosis Grading Comment     ARMSTRONG Scoring Comment     Limitations: Comment     Comment Comment      *Note: Due to a large number of results and/or encounters for the requested time period, some results have not been displayed. A complete set of results can be found in Results Review.       Some portions of this note have been dictated using voice recognition software and may contain errors and/or omissions.

## 2020-03-17 NOTE — PATIENT INSTRUCTIONS

## 2020-03-28 DIAGNOSIS — G89.4 CHRONIC PAIN SYNDROME: Chronic | ICD-10-CM

## 2020-03-30 RX ORDER — GABAPENTIN 800 MG/1
TABLET ORAL
Qty: 90 TABLET | Refills: 2 | OUTPATIENT
Start: 2020-03-30

## 2020-04-13 DIAGNOSIS — G89.4 CHRONIC PAIN SYNDROME: Chronic | ICD-10-CM

## 2020-04-13 RX ORDER — GABAPENTIN 800 MG/1
TABLET ORAL
Qty: 90 TABLET | Refills: 2 | Status: SHIPPED | OUTPATIENT
Start: 2020-04-13 | End: 2020-07-08 | Stop reason: SDUPTHER

## 2020-06-27 DIAGNOSIS — G89.4 CHRONIC PAIN SYNDROME: Chronic | ICD-10-CM

## 2020-06-29 RX ORDER — GABAPENTIN 800 MG/1
TABLET ORAL
Qty: 90 TABLET | Refills: 2 | OUTPATIENT
Start: 2020-06-29

## 2020-07-07 ENCOUNTER — TRANSCRIBE ORDERS (OUTPATIENT)
Dept: ADMINISTRATIVE | Facility: HOSPITAL | Age: 35
End: 2020-07-07

## 2020-07-07 ENCOUNTER — TELEPHONE (OUTPATIENT)
Dept: FAMILY MEDICINE CLINIC | Facility: CLINIC | Age: 35
End: 2020-07-07

## 2020-07-07 DIAGNOSIS — Z01.818 PREOP TESTING: Primary | ICD-10-CM

## 2020-07-07 NOTE — TELEPHONE ENCOUNTER
Pt called and requested a refill for gabapentin (NEURONTIN) 800 MG tablet. Please send to [facility]. Also, pt is requesting a call back to discuss protonix. Please advise.

## 2020-07-08 DIAGNOSIS — G89.4 CHRONIC PAIN SYNDROME: Chronic | ICD-10-CM

## 2020-07-08 RX ORDER — GABAPENTIN 800 MG/1
TABLET ORAL
Qty: 90 TABLET | Refills: 0 | Status: SHIPPED | OUTPATIENT
Start: 2020-07-08 | End: 2020-08-07 | Stop reason: SDUPTHER

## 2020-07-27 RX ORDER — AMLODIPINE BESYLATE 5 MG/1
5 TABLET ORAL DAILY
Qty: 90 TABLET | Refills: 1 | Status: SHIPPED | OUTPATIENT
Start: 2020-07-27 | End: 2021-01-28 | Stop reason: SDUPTHER

## 2020-08-07 DIAGNOSIS — G89.4 CHRONIC PAIN SYNDROME: Chronic | ICD-10-CM

## 2020-08-07 RX ORDER — GABAPENTIN 800 MG/1
TABLET ORAL
Qty: 30 TABLET | Refills: 0 | Status: SHIPPED | OUTPATIENT
Start: 2020-08-07 | End: 2020-08-14 | Stop reason: SDUPTHER

## 2020-08-14 ENCOUNTER — OFFICE VISIT (OUTPATIENT)
Dept: FAMILY MEDICINE CLINIC | Facility: CLINIC | Age: 35
End: 2020-08-14

## 2020-08-14 VITALS
BODY MASS INDEX: 24 KG/M2 | DIASTOLIC BLOOD PRESSURE: 80 MMHG | HEIGHT: 73 IN | SYSTOLIC BLOOD PRESSURE: 138 MMHG | WEIGHT: 181.1 LBS

## 2020-08-14 DIAGNOSIS — F15.10 METHAMPHETAMINE USE (HCC): Chronic | ICD-10-CM

## 2020-08-14 DIAGNOSIS — M20.61 ACQUIRED DEFORMITIES OF TOE(S), UNSPECIFIED, RIGHT FOOT: Chronic | ICD-10-CM

## 2020-08-14 DIAGNOSIS — F17.200 TOBACCO DEPENDENCE SYNDROME: Chronic | ICD-10-CM

## 2020-08-14 DIAGNOSIS — Z72.89 SELF-DESTRUCTIVE BEHAVIOR: ICD-10-CM

## 2020-08-14 DIAGNOSIS — G89.4 CHRONIC PAIN SYNDROME: Chronic | ICD-10-CM

## 2020-08-14 DIAGNOSIS — F11.21 HISTORY OF NARCOTIC ADDICTION (HCC): Primary | Chronic | ICD-10-CM

## 2020-08-14 DIAGNOSIS — I10 ESSENTIAL HYPERTENSION: Chronic | ICD-10-CM

## 2020-08-14 PROCEDURE — 99214 OFFICE O/P EST MOD 30 MIN: CPT | Performed by: FAMILY MEDICINE

## 2020-08-14 RX ORDER — GABAPENTIN 800 MG/1
TABLET ORAL
Qty: 90 TABLET | Refills: 2 | Status: SHIPPED | OUTPATIENT
Start: 2020-08-14 | End: 2020-11-11 | Stop reason: SDUPTHER

## 2020-08-14 RX ORDER — HYDROCODONE BITARTRATE AND ACETAMINOPHEN 5; 325 MG/1; MG/1
1 TABLET ORAL EVERY 8 HOURS PRN
COMMUNITY
End: 2020-11-11

## 2020-08-14 NOTE — PROGRESS NOTES
Subjective   Christiano King is a 35 y.o. male.  Reevaluation hypertension chronic pain syndrome chronic deformities of foot polysubstance abuse.  Self-destructive behavior.  In the interim continues to have a rambling and mumbling history.  Denies having a positive drug screen in March in the ER.  Was positive for meth.  States now he is getting a pain medicine from pain management indeed we do have a cast but it shows 2 prescriptions for hydrocodone.  Gabapentin has been taken off appropriately.  Has not followed up with GI medicine in regards to his hepatitis C and never did follow-up with podiatry.  Again we have counseled on need for better follow-up and better lifestyle choices in the past.  Suspect this is probably minimally effective.    History of Present Illness   HPI    The following portions of the patient's history were reviewed and updated as appropriate: allergies, current medications, past family history, past medical history, past social history, past surgical history and problem list.    Review of Systems  Review of Systems   Constitutional: Negative for activity change, appetite change, fatigue and unexpected weight change.   HENT: Negative for trouble swallowing and voice change.    Eyes: Negative for redness and visual disturbance.   Respiratory: Negative for cough and wheezing.    Cardiovascular: Negative for chest pain and palpitations.   Gastrointestinal: Negative for abdominal pain, constipation, diarrhea, nausea and vomiting.   Genitourinary: Negative for urgency.   Musculoskeletal: Positive for arthralgias, back pain and myalgias. Negative for joint swelling.   Neurological: Negative for syncope and headaches.   Hematological: Negative for adenopathy.   Psychiatric/Behavioral: Positive for decreased concentration. Negative for sleep disturbance.       Objective   Physical Exam  Physical Exam   Constitutional: He is oriented to person, place, and time. He appears well-developed.  "  HENT:   Head: Normocephalic.   Eyes: Pupils are equal, round, and reactive to light.   Neck: Normal range of motion. No thyromegaly present.   Cardiovascular: Normal rate, regular rhythm, normal heart sounds and intact distal pulses. Exam reveals no gallop and no friction rub.   No murmur heard.  Pulmonary/Chest: Breath sounds normal.   Abdominal: Soft. He exhibits no distension and no mass. There is no tenderness.   Musculoskeletal: Normal range of motion.   Deformed left foot shows callus formation but no skin breakdown heel and toe.  Needs podiatry intervention counseled on same   Neurological: He is alert and oriented to person, place, and time. He has normal reflexes.   Skin: Skin is warm and dry.   Psychiatric: His affect is blunt. His speech is delayed, tangential and slurred. He is slowed.         Visit Vitals  /80   Ht 185.4 cm (73\")   Wt 82.1 kg (181 lb 1.6 oz)   BMI 23.89 kg/m²     Body mass index is 23.89 kg/m².      Assessment/Plan   Christiano was seen today for follow-up.    Diagnoses and all orders for this visit:    History of narcotic addiction (CMS/HCC)    Methamphetamine use (CMS/[facility])    Acquired deformities of toe(s), unspecified, right foot  -     Ambulatory Referral to Podiatry    Essential hypertension    Tobacco dependence syndrome    Self-destructive behavior     on stopping smoking.  3-10m     Counseled self-destructive behavior counseled following with all subspecialist medicines refilled.  Recheck 6 months.  "

## 2020-08-28 ENCOUNTER — TRANSCRIBE ORDERS (OUTPATIENT)
Dept: ADMINISTRATIVE | Facility: HOSPITAL | Age: 35
End: 2020-08-28

## 2020-08-28 DIAGNOSIS — Z01.818 PREOP TESTING: Primary | ICD-10-CM

## 2020-10-12 RX ORDER — PANTOPRAZOLE SODIUM 40 MG/1
40 TABLET, DELAYED RELEASE ORAL DAILY
Qty: 90 TABLET | Refills: 1 | Status: SHIPPED | OUTPATIENT
Start: 2020-10-12 | End: 2021-01-28 | Stop reason: SDUPTHER

## 2020-10-28 DIAGNOSIS — G89.4 CHRONIC PAIN SYNDROME: Chronic | ICD-10-CM

## 2020-10-28 RX ORDER — GABAPENTIN 800 MG/1
TABLET ORAL
Qty: 90 TABLET | Refills: 2 | OUTPATIENT
Start: 2020-10-28

## 2020-11-05 ENCOUNTER — TELEPHONE (OUTPATIENT)
Dept: FAMILY MEDICINE CLINIC | Facility: CLINIC | Age: 35
End: 2020-11-05

## 2020-11-05 NOTE — TELEPHONE ENCOUNTER
PATIENT CALLING IN WANTING TO SPEAK WITH DR. CONTRERAS ABOUT GETTING A DIFFERENT PAIN MANAGEMENT -- PATIENT STATES HE HAS NOT HAD ANY PAIN MEDICATION IN 3 MONTHS AND DOES NOT WANT TO CONTINUE USING THE PAIN MANAGEMENT  THAT HE HAS CURRENTLY.    PATIENT CALLBACK # [phone]

## 2020-11-11 ENCOUNTER — OFFICE VISIT (OUTPATIENT)
Dept: FAMILY MEDICINE CLINIC | Facility: CLINIC | Age: 35
End: 2020-11-11

## 2020-11-11 VITALS
SYSTOLIC BLOOD PRESSURE: 130 MMHG | HEIGHT: 73 IN | WEIGHT: 186 LBS | BODY MASS INDEX: 24.65 KG/M2 | DIASTOLIC BLOOD PRESSURE: 78 MMHG

## 2020-11-11 DIAGNOSIS — I10 ESSENTIAL HYPERTENSION: Primary | Chronic | ICD-10-CM

## 2020-11-11 DIAGNOSIS — M20.61 ACQUIRED DEFORMITIES OF TOE(S), UNSPECIFIED, RIGHT FOOT: Chronic | ICD-10-CM

## 2020-11-11 DIAGNOSIS — G89.4 CHRONIC PAIN SYNDROME: Chronic | ICD-10-CM

## 2020-11-11 DIAGNOSIS — Z72.89 SELF-DESTRUCTIVE BEHAVIOR: Chronic | ICD-10-CM

## 2020-11-11 DIAGNOSIS — B18.2 CHRONIC HEPATITIS C WITHOUT HEPATIC COMA (HCC): Chronic | ICD-10-CM

## 2020-11-11 DIAGNOSIS — Z91.199 NONCOMPLIANCE: ICD-10-CM

## 2020-11-11 DIAGNOSIS — Z23 NEED FOR IMMUNIZATION AGAINST INFLUENZA: ICD-10-CM

## 2020-11-11 PROCEDURE — 90686 IIV4 VACC NO PRSV 0.5 ML IM: CPT | Performed by: FAMILY MEDICINE

## 2020-11-11 PROCEDURE — 90471 IMMUNIZATION ADMIN: CPT | Performed by: FAMILY MEDICINE

## 2020-11-11 PROCEDURE — 99214 OFFICE O/P EST MOD 30 MIN: CPT | Performed by: FAMILY MEDICINE

## 2020-11-11 RX ORDER — GABAPENTIN 800 MG/1
TABLET ORAL
Qty: 90 TABLET | Refills: 2 | Status: SHIPPED | OUTPATIENT
Start: 2020-11-11 | End: 2021-01-28 | Stop reason: SDUPTHER

## 2020-11-11 NOTE — PROGRESS NOTES
Subjective   Christiano King is a 35 y.o. male.  Reevaluation hypertension reflux chronic pain syndrome noncompliance self-destructive behavior.  In interim apparently only visited and pain management once again continues to confabulate and perseverate in regards to his narcotics we have counseled once again we would not be dealing with that here and that we referred to another pain management beyond that we will be refilling gabapentin only others will not be discussed.  Also did not keep follow-up with both GI and/or podiatry in regards to his hepatitis C and his foot deformity we have encouraged rereferral back.  Continues on self-destructive noncompliant path    History of Present Illness   HPI    The following portions of the patient's history were reviewed and updated as appropriate: allergies, current medications, past family history, past medical history, past social history, past surgical history and problem list.    Review of Systems  Review of Systems   Constitutional: Negative for activity change, appetite change, fatigue and unexpected weight change.   HENT: Negative for trouble swallowing and voice change.    Eyes: Negative for redness and visual disturbance.   Respiratory: Negative for cough and wheezing.    Cardiovascular: Negative for chest pain and palpitations.   Gastrointestinal: Negative for abdominal pain, constipation, diarrhea, nausea and vomiting.   Genitourinary: Negative for urgency.   Musculoskeletal: Positive for arthralgias. Negative for joint swelling.   Neurological: Negative for syncope and headaches.   Hematological: Negative for adenopathy.   Psychiatric/Behavioral: Negative for sleep disturbance.       Objective   Physical Exam  Physical Exam  HENT:      Head: Normocephalic.   Neck:      Musculoskeletal: Normal range of motion.   Cardiovascular:      Rate and Rhythm: Normal rate.   Pulmonary:      Effort: Pulmonary effort is normal.   Abdominal:      General: Abdomen is  "flat.   Neurological:      Mental Status: He is alert.   Psychiatric:         Mood and Affect: Mood is anxious.         Speech: Speech is rapid and pressured and tangential.         Behavior: Behavior is cooperative.           Visit Vitals  /78   Ht 185.4 cm (73\")   Wt 84.4 kg (186 lb)   BMI 24.54 kg/m²     Body mass index is 24.54 kg/m².      Assessment/Plan   Diagnoses and all orders for this visit:    1. Essential hypertension (Primary)  -     Ambulatory Referral to Pain Management    2. Chronic hepatitis C without hepatic coma (CMS/HCC)    3. Chronic pain syndrome  -     gabapentin (NEURONTIN) 800 MG tablet; 1 tid  Dispense: 90 tablet; Refill: 2  -     Ambulatory Referral to Pain Management    4. Self-destructive behavior    5. Need for immunization against influenza  -     Fluarix/Fluzone/Afluria Quad>6 Months    6. Noncompliance    7. Acquired deformities of toe(s), unspecified, right foot  -     Ambulatory Referral to Pain Management  -     Ambulatory Referral to Podiatry    Is as above history noted recheck 6 months  "

## 2020-11-19 ENCOUNTER — TELEPHONE (OUTPATIENT)
Dept: FAMILY MEDICINE CLINIC | Facility: CLINIC | Age: 35
End: 2020-11-19

## 2020-11-23 ENCOUNTER — OFFICE VISIT (OUTPATIENT)
Dept: PODIATRY | Facility: CLINIC | Age: 35
End: 2020-11-23

## 2020-11-23 VITALS — OXYGEN SATURATION: 99 % | HEIGHT: 73 IN | BODY MASS INDEX: 24.25 KG/M2 | HEART RATE: 82 BPM | WEIGHT: 183 LBS

## 2020-11-23 DIAGNOSIS — Z89.421 HISTORY OF AMPUTATION OF LESSER TOE OF RIGHT FOOT (HCC): ICD-10-CM

## 2020-11-23 DIAGNOSIS — Q66.70 PES CAVUS: ICD-10-CM

## 2020-11-23 DIAGNOSIS — L03.031 CELLULITIS OF TOE OF RIGHT FOOT: Primary | ICD-10-CM

## 2020-11-23 DIAGNOSIS — L97.512 SKIN ULCER OF TOE OF RIGHT FOOT WITH FAT LAYER EXPOSED (HCC): ICD-10-CM

## 2020-11-23 DIAGNOSIS — M79.671 RIGHT FOOT PAIN: ICD-10-CM

## 2020-11-23 PROCEDURE — 11042 DBRDMT SUBQ TIS 1ST 20SQCM/<: CPT | Performed by: PODIATRIST

## 2020-11-23 PROCEDURE — 99204 OFFICE O/P NEW MOD 45 MIN: CPT | Performed by: PODIATRIST

## 2020-11-23 RX ORDER — CLINDAMYCIN HYDROCHLORIDE 300 MG/1
300 CAPSULE ORAL 3 TIMES DAILY
Qty: 30 CAPSULE | Refills: 0 | Status: SHIPPED | OUTPATIENT
Start: 2020-11-23 | End: 2020-12-21
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
## 2020-11-30 ENCOUNTER — TELEPHONE (OUTPATIENT)
Dept: FAMILY MEDICINE CLINIC | Facility: CLINIC | Age: 35
End: 2020-11-30

## 2020-12-01 NOTE — TELEPHONE ENCOUNTER
TRIED CALLING PT TO SEE IF HE WOULD LIKE THE REFERRAL SENT TO A DIFFERENT LOCATION. NO ANSWER. NO VM

## 2020-12-14 ENCOUNTER — TELEPHONE (OUTPATIENT)
Dept: FAMILY MEDICINE CLINIC | Facility: CLINIC | Age: 35
End: 2020-12-14

## 2020-12-14 NOTE — TELEPHONE ENCOUNTER
Pt would like a call back tomorrow concerning his pain management  Referral. He is saying that is Dr. Elam will not see him, he says if he can not see Marialuisa he wants to see Dr. Dorado or Khurram Harrington. I tried to explain to him they were also Family practice. He insist they are pain management. He also says that it might be because he has not seen his hepatitis doctor. Please call, thanks!

## 2020-12-21 ENCOUNTER — OFFICE VISIT (OUTPATIENT)
Dept: PODIATRY | Facility: CLINIC | Age: 35
End: 2020-12-21

## 2020-12-21 VITALS — HEIGHT: 73 IN | BODY MASS INDEX: 24.25 KG/M2 | OXYGEN SATURATION: 98 % | WEIGHT: 183 LBS | HEART RATE: 78 BPM

## 2020-12-21 DIAGNOSIS — M79.671 RIGHT FOOT PAIN: ICD-10-CM

## 2020-12-21 DIAGNOSIS — L03.031 CELLULITIS OF TOE OF RIGHT FOOT: Primary | ICD-10-CM

## 2020-12-21 DIAGNOSIS — L97.512 SKIN ULCER OF TOE OF RIGHT FOOT WITH FAT LAYER EXPOSED (HCC): ICD-10-CM

## 2020-12-21 DIAGNOSIS — Z89.421 HISTORY OF AMPUTATION OF LESSER TOE OF RIGHT FOOT (HCC): ICD-10-CM

## 2020-12-21 PROCEDURE — 11042 DBRDMT SUBQ TIS 1ST 20SQCM/<: CPT | Performed by: PODIATRIST

## 2020-12-21 PROCEDURE — 99213 OFFICE O/P EST LOW 20 MIN: CPT | Performed by: PODIATRIST

## 2020-12-21 RX ORDER — SULFAMETHOXAZOLE AND TRIMETHOPRIM 800; 160 MG/1; MG/1
1 TABLET ORAL 2 TIMES DAILY
Qty: 20 TABLET | Refills: 0 | Status: SHIPPED | OUTPATIENT
Start: 2020-12-21 | End: 2021-02-22

## 2020-12-21 NOTE — PROGRESS NOTES
Christiano King  1985  35 y.o. male    Patient presents to clinic today for a wound check on right hallux.   12/21/2020    Chief Complaint   Patient presents with   • Right Foot - Follow-up, Wound Check           History of Present Illness    Christiano King is a 35 y.o. male who presents for follow-up of right foot ulceration, cellulitis.  At last visit he was started on clindamycin and advised to return in 2 weeks.  This was 4 weeks ago.  He states that he took approximately 10 of his antibiotic capsules but felt they hurt his stomach and discontinued without notifying the office.  He continues to note pain to the site but denies any other symptoms.    Past Medical History:   Diagnosis Date   • Acquired deformities of toe(s), unspecified, right foot    • Chronic pain disorder    • Chronic viral hepatitis C (CMS/HCC)    • Cirrhosis of liver (CMS/HCC)    • Generalized pain     Generalized aches and pains      • Hypertensive disorder    • Pain in right leg    • Right upper quadrant pain    • Tobacco dependence syndrome          Past Surgical History:   Procedure Laterality Date   • TOE AMPUTATION      PARTIAL AMPUTATION OF TOE 46053 (1)            Family History   Problem Relation Age of Onset   • Diabetes Father    • Diabetes Brother    • Coronary artery disease Brother    • Diabetes Mother    • Hypertension Mother    • Cancer Paternal Aunt          Social History     Socioeconomic History   • Marital status: Single     Spouse name: Not on file   • Number of children: Not on file   • Years of education: Not on file   • Highest education level: Not on file   Tobacco Use   • Smoking status: Current Every Day Smoker     Types: Cigarettes   • Smokeless tobacco: Current User     Types: Snuff   • Tobacco comment: 10/19 cigarettes/day   Substance and Sexual Activity   • Alcohol use: Yes     Comment: Beer   • Drug use: No   • Sexual activity: Defer         Current Outpatient Medications   Medication Sig  "Dispense Refill   • amLODIPine (NORVASC) 5 MG tablet Take 1 tablet by mouth Daily. 90 tablet 1   • gabapentin (NEURONTIN) 800 MG tablet 1 tid 90 tablet 2   • pantoprazole (Protonix) 40 MG EC tablet Take 1 tablet by mouth Daily. 90 tablet 1   • sulfamethoxazole-trimethoprim (Bactrim DS) 800-160 MG per tablet Take 1 tablet by mouth 2 (Two) Times a Day. 20 tablet 0     No current facility-administered medications for this visit.          OBJECTIVE    Pulse 78   Ht 185.4 cm (73\")   Wt 83 kg (183 lb)   SpO2 98%   BMI 24.14 kg/m²       Review of Systems   Constitutional: Negative.    Eyes: Negative.    Respiratory: Positive for cough, shortness of breath and wheezing.    Cardiovascular: Negative.    Gastrointestinal: Negative.    Endocrine: Negative.    Genitourinary: Negative.    Musculoskeletal: Positive for back pain.        Joint pain  Ankle pain  Foot pain   Skin: Positive for wound.   Allergic/Immunologic: Negative.    Neurological: Positive for headaches.   Hematological: Negative.    Psychiatric/Behavioral: Negative.          Physical Exam     Constitutional: he appears well-developed and well-nourished.   HEENT: Normocephalic. Atraumatic  CV: No tenderness. RRR  Resp: Non-labored respiration. No wheezes.   Psychiatric: he has a normal mood and affect. his   behavior is normal.      Lower Extremity Exam:  Vascular: DP/PT pulses palpable 2+.   Positive hair growth.   No perimalleolar edema  Neuro: Protective sensation intact, b/l.  DTRs intact  Integument: Right plantar hallux ulceration measuring 0.4 x 0.4 x 0.2 cm.  Fibrogranular base.  Mild surrounding erythema, serous drainage.  No streaking cellulitis.  Positive tenderness palpation.  Nails 1 and 5 on right foot thickened and dystrophic  Skin quality normal  No masses  Webspaces c/d/i  Musculoskeletal: LE muscle strength 5/5.   Gait Normal  Prior partial amputation of right second third and fourth digits.    Right foot wound debridement:  Risks and " benefits discussed.  Right hallux ulcer debrided of surrounding hyperkeratosis and devitalized tissue with tissue nipper to level of subq  Pressure hemostasis obtained  Medihoney ointment and dsd applied.            ASSESSMENT AND PLAN    Diagnoses and all orders for this visit:    1. Skin ulcer of toe of right foot with fat layer exposed (CMS/HCC) (Primary)    2. Cellulitis of toe of right foot    3. History of amputation of lesser toe of right foot (CMS/HCC)    4. Right foot pain    Other orders  -     sulfamethoxazole-trimethoprim (Bactrim DS) 800-160 MG per tablet; Take 1 tablet by mouth 2 (Two) Times a Day.  Dispense: 20 tablet; Refill: 0      -Comprehensive foot and ankle exam  -Discussed staples of local wound care including serial debridement, offloading and dressing changes.  Wound debridement as above.  -Continue offloading surgical shoe, local wound care  -Rx Bactrim DS twice daily for 10 days.  Counseled patient on importance of completing entire antibiotic course, instruction to contact the office if he has any side effects taking this.  -Recheck 2 weeks        This document has been electronically signed by Khurram Haywood DPM on December 24, 2020 10:48 CST     EMR Dragon/Transcription disclaimer:   Much of this encounter note is an electronic transcription/translation of spoken language to printed text. The electronic translation of spoken language may permit erroneous, or at times, nonsensical words or phrases to be inadvertently transcribed; Although I have reviewed the note for such errors, some may still exist.    Khurram Haywood DPM  12/24/2020  10:48 CST

## 2020-12-22 ENCOUNTER — OFFICE VISIT (OUTPATIENT)
Dept: GASTROENTEROLOGY | Facility: CLINIC | Age: 35
End: 2020-12-22

## 2020-12-22 VITALS
DIASTOLIC BLOOD PRESSURE: 73 MMHG | HEART RATE: 95 BPM | HEIGHT: 73 IN | BODY MASS INDEX: 24.04 KG/M2 | WEIGHT: 181.4 LBS | SYSTOLIC BLOOD PRESSURE: 138 MMHG

## 2020-12-22 DIAGNOSIS — R74.8 ELEVATED LIVER ENZYMES: ICD-10-CM

## 2020-12-22 DIAGNOSIS — K74.00 HEPATIC FIBROSIS: ICD-10-CM

## 2020-12-22 DIAGNOSIS — K21.00 GASTROESOPHAGEAL REFLUX DISEASE WITH ESOPHAGITIS WITHOUT HEMORRHAGE: ICD-10-CM

## 2020-12-22 DIAGNOSIS — R19.7 DIARRHEA, UNSPECIFIED TYPE: ICD-10-CM

## 2020-12-22 DIAGNOSIS — B18.2 CHRONIC HEPATITIS C WITHOUT HEPATIC COMA (HCC): Primary | ICD-10-CM

## 2020-12-22 PROCEDURE — 99214 OFFICE O/P EST MOD 30 MIN: CPT | Performed by: PHYSICIAN ASSISTANT

## 2020-12-22 NOTE — PROGRESS NOTES
Chief Complaint   Patient presents with   • Hepatitis C   • Elevated Hepatic Enzymes       ENDO PROCEDURE ORDERED:    Subjective    Christiano King is a 35 y.o. male. he is here today for follow-up.    History of Present Illness    Patient seen on a recheck of his chronic active hepatitis C, abdominal pain, elevated liver enzymes, GERD. Last seen 03/17/2020. Genotype 1a/F3. Patient never followed up. He was supposed to get medication for hepatitis C, but there are no notes indicating what happened with trying to get that approved. He states he has been primarily having foot issues. Interestingly he rambles quite a lot during his visit, mainly a word salad, and it is somewhat difficult to tell exactly if he is complaining of anything in particular. He denies any alcohol use. He states he has had some diarrhea but no blood in his stool. He complains of 8 out of 10 abdominal pain. He did request pain medication from my MA but did not request it from me. Weight is up 2.4 pounds since last visit. He has not had a colonoscopy.    ASSESSMENT/PLAN: Patient with chronic active hepatitis C, no recent studies, questionable compliance. He was encouraged to keep up with his physicians for his foot. Will need repeat right upper quadrant ultrasound, CBC, CMP, AFP, INR, HCV RNA by PCR quantitative, HCV FibroSURE. Will plan further pending clinical course and the results of the above.       The following portions of the patient's history were reviewed and updated as appropriate:   Past Medical History:   Diagnosis Date   • Acquired deformities of toe(s), unspecified, right foot    • Chronic pain disorder    • Chronic viral hepatitis C (CMS/HCC)    • Cirrhosis of liver (CMS/HCC)    • Generalized pain     Generalized aches and pains      • Hypertensive disorder    • Pain in right leg    • Right upper quadrant pain    • Tobacco dependence syndrome      Past Surgical History:   Procedure Laterality Date   • TOE AMPUTATION       "PARTIAL AMPUTATION OF TOE 34503 (1)        Family History   Problem Relation Age of Onset   • Diabetes Father    • Diabetes Brother    • Coronary artery disease Brother    • Diabetes Mother    • Hypertension Mother    • Cancer Paternal Aunt        Allergies   Allergen Reactions   • Penicillins Swelling     Rx:  Facial swelling     Social History     Socioeconomic History   • Marital status: Single     Spouse name: Not on file   • Number of children: Not on file   • Years of education: Not on file   • Highest education level: Not on file   Tobacco Use   • Smoking status: Current Every Day Smoker     Types: Cigarettes   • Smokeless tobacco: Current User     Types: Snuff   • Tobacco comment: 10/19 cigarettes/day   Substance and Sexual Activity   • Alcohol use: Yes     Comment: Beer   • Drug use: No   • Sexual activity: Defer     Current Medications:  Prior to Admission medications    Medication Sig Start Date End Date Taking? Authorizing Provider   amLODIPine (NORVASC) 5 MG tablet Take 1 tablet by mouth Daily. 7/27/20  Yes Rich Harrington MD   gabapentin (NEURONTIN) 800 MG tablet 1 tid 11/11/20  Yes Rich Harrington MD   pantoprazole (Protonix) 40 MG EC tablet Take 1 tablet by mouth Daily. 10/12/20  Yes Rich Harrington MD   sulfamethoxazole-trimethoprim (Bactrim DS) 800-160 MG per tablet Take 1 tablet by mouth 2 (Two) Times a Day. 12/21/20  Yes Khurram Haywood DPM     Review of Systems  Review of Systems       Objective    /73   Pulse 95   Ht 185.4 cm (73\")   Wt 82.3 kg (181 lb 6.4 oz)   BMI 23.93 kg/m²   Physical Exam  Vitals signs and nursing note reviewed.   Constitutional:       General: He is not in acute distress.     Appearance: He is well-developed.   HENT:      Head: Normocephalic and atraumatic.   Eyes:      Pupils: Pupils are equal, round, and reactive to light.   Neck:      Musculoskeletal: Normal range of motion.   Cardiovascular:      Rate and Rhythm: Normal rate and regular rhythm.      Heart " sounds: Normal heart sounds.   Pulmonary:      Effort: Pulmonary effort is normal.      Breath sounds: Normal breath sounds.   Abdominal:      General: Bowel sounds are normal. There is no distension or abdominal bruit.      Palpations: Abdomen is soft. Abdomen is not rigid. There is no shifting dullness or mass.      Tenderness: There is abdominal tenderness. There is no guarding or rebound.      Hernia: No hernia is present. There is no hernia in the ventral area.   Musculoskeletal: Normal range of motion.   Skin:     General: Skin is warm and dry.   Neurological:      Mental Status: He is alert and oriented to person, place, and time.   Psychiatric:         Behavior: Behavior normal.         Thought Content: Thought content normal.         Judgment: Judgment normal.       Assessment/Plan      1. Chronic hepatitis C without hepatic coma (CMS/HCC)    2. Elevated liver enzymes    3. Gastroesophageal reflux disease with esophagitis without hemorrhage    4. Hepatic fibrosis    5. Diarrhea, unspecified type    .   Diagnoses and all orders for this visit:    1. Chronic hepatitis C without hepatic coma (CMS/HCC) (Primary)  -     CBC & Differential  -     Comprehensive Metabolic Panel  -     Protime-INR  -     Iron  -     AFP Tumor Marker  -     HCV FibroSURE  -     HCV RT-PCR,Quant(Non-Graph)  -     Hepatitis B Surface Antigen  -     Hepatitis B Surf Antibody Quant  -     US Liver    2. Elevated liver enzymes  -     CBC & Differential  -     Comprehensive Metabolic Panel  -     Protime-INR  -     Iron  -     AFP Tumor Marker  -     HCV FibroSURE  -     HCV RT-PCR,Quant(Non-Graph)  -     Hepatitis B Surface Antigen  -     Hepatitis B Surf Antibody Quant  -     US Liver    3. Gastroesophageal reflux disease with esophagitis without hemorrhage    4. Hepatic fibrosis    5. Diarrhea, unspecified type        Orders placed during this encounter include:  Orders Placed This Encounter   Procedures   • US Liver     Scheduling  Instructions:      RUQ     Order Specific Question:   Reason for Exam:     Answer:   HCV, hepatic fibrosis     Order Specific Question:   Will this be performed with Elastography? ([facility], and [facility] ONLY)     Answer:   No   • Comprehensive Metabolic Panel   • Protime-INR   • Iron   • AFP Tumor Marker   • HCV FibroSURE   • HCV RT-PCR,Quant(Non-Graph)   • Hepatitis B Surface Antigen   • Hepatitis B Surf Antibody Quant   • CBC & Differential     Order Specific Question:   Manual Differential     Answer:   No       Medications prescribed:  No orders of the defined types were placed in this encounter.      Requested Prescriptions      No prescriptions requested or ordered in this encounter       Review and/or summary of lab tests, radiology, procedures, medications. Review and summary of old records and obtaining of history. The risks and benefits of my recommendations, as well as other treatment options were discussed with the patient today. Questions were answered.    Follow-up: Return in about 1 month (around 1/22/2021), or if symptoms worsen or fail to improve, for lab/US prior.     * Surgery not found *      This document has been electronically signed by Travis Haywood PA-C on December 24, 2020 13:33 CST      Results for orders placed or performed in visit on 02/18/20   Hep B Confirmation Tube    Specimen: Blood   Result Value Ref Range    Extra Tube Hold for add-ons.    Hepatitis B Surface Antigen    Specimen: Blood   Result Value Ref Range    Hepatitis B Surface Ag Non-Reactive Non-Reactive   HCV FibroSURE    Specimen: Blood   Result Value Ref Range    Fibrosis Score 0.65 (H) 0.00 - 0.21    Fibrosis Stage Comment     Necroinflammat Activity Score 0.64 (H) 0.00 - 0.17    Necroinflammat Activity Grade A3-Severe activity     Alpha 2-Macroglobulins, Qn 269 110 - 276 mg/dL    Haptoglobin 30 17 - 317 mg/dL    Apolipoprotein A-1 106 101 - 178 mg/dL    Total Bilirubin 0.4 0.0 - 1.2 mg/dL    GGT 96 (H) 0 -  65 IU/L    ALT (SGPT) 88 (H) 0 - 55 IU/L    HCV Qual Interp Comment     Fibrosis Scoring: Comment     Necroinflamm Activity Scoring: Comment     Limitations: Comment     Comment Comment    Hepatitis B Surf Antibody Quant    Specimen: Blood   Result Value Ref Range    Hepatitis B Surface Ab Quant 34.3 Immunity>9.9 mIU/mL   Nuclear Antigen Antibody, IFA    Specimen: Blood   Result Value Ref Range    CARI Negative    Iron and TIBC    Specimen: Blood   Result Value Ref Range    Iron 85 59 - 158 mcg/dL    Iron Saturation 24 20 - 50 %    Transferrin 239 200 - 360 mg/dL    TIBC 356 298 - 536 mcg/dL   Alpha - 1 - Antitrypsin    Specimen: Blood   Result Value Ref Range    ALPHA -1 ANTITRYPSIN 133 90 - 200 mg/dL   Hepatitis B E Antibody    Specimen: Blood   Result Value Ref Range    Hep B E Ab Negative Negative   Mitochondrial Antibodies, M2    Specimen: Blood   Result Value Ref Range    Mitochondrial Ab <20.0 0.0 - 20.0 Units   Ceruloplasmin    Specimen: Blood   Result Value Ref Range    Ceruloplasmin 23 16 - 31 mg/dL   Urine Drug Screen - Urine, Clean Catch    Specimen: Urine, Clean Catch   Result Value Ref Range    THC, Screen, Urine Negative Negative    Phencyclidine (PCP), Urine Negative Negative    Cocaine Screen, Urine Negative Negative    Methamphetamine, Ur Positive (A) Negative    Opiate Screen Negative Negative    Amphetamine Screen, Urine Negative Negative    Benzodiazepine Screen, Urine Negative Negative    Tricyclic Antidepressants Screen Negative Negative    Methadone Screen, Urine Negative Negative    Barbiturates Screen, Urine Negative Negative    Oxycodone Screen, Urine Negative Negative    Propoxyphene Screen Negative Negative    Buprenorphine, Screen, Urine Negative Negative   AFP Tumor Marker    Specimen: Blood   Result Value Ref Range    ALPHA-FETOPROTEIN 12.11 (H) 0 - 8.3 ng/mL   Hepatitis B Core Antibody, Total    Specimen: Blood   Result Value Ref Range    Hep B Core Total Ab Negative Negative    Hepatitis C Genotype    Specimen: Blood   Result Value Ref Range    Please note Comment     Hepatitis C Genotype 1a    Anti-Smooth Muscle Antibody Titer    Specimen: Blood   Result Value Ref Range    Smooth Muscle Ab 6 0 - 19 Units   Protime-INR    Specimen: Blood   Result Value Ref Range    Protime 14.7 11.1 - 15.3 Seconds    INR 1.18 0.80 - 1.20   Ferritin    Specimen: Blood   Result Value Ref Range    Ferritin 239.00 30.00 - 400.00 ng/mL   Results for orders placed or performed in visit on 02/13/20   Hepatitis C RNA, Quantitative, PCR (graph)    Specimen: Blood   Result Value Ref Range    Hepatitis C Quantitation 318605 IU/mL    HCV log10 5.978 log10 IU/mL    Test Information Comment    Results for orders placed or performed in visit on 06/19/19   Urine Drug Screen - Urine, Clean Catch    Specimen: Urine, Clean Catch   Result Value Ref Range    Amphet/Methamphet, Screen Negative Negative    Barbiturates Screen, Urine Negative Negative    Benzodiazepine Screen, Urine Negative Negative    Cocaine Screen, Urine Negative Negative    Opiate Screen Negative Negative    THC, Screen, Urine Negative Negative    Methadone Screen, Urine Negative Negative    Oxycodone Screen, Urine Negative Negative   MicroAlbumin, Urine, Random - Urine, Clean Catch    Specimen: Urine, Clean Catch   Result Value Ref Range    Microalbumin, Urine 3.9 mg/L   Magnesium    Specimen: Blood   Result Value Ref Range    Magnesium 1.8 1.6 - 2.6 mg/dL   Comprehensive Metabolic Panel    Specimen: Blood   Result Value Ref Range    Glucose 92 65 - 99 mg/dL    BUN 8 6 - 20 mg/dL    Creatinine 0.81 0.76 - 1.27 mg/dL    Sodium 142 136 - 145 mmol/L    Potassium 4.4 3.5 - 5.2 mmol/L    Chloride 104 98 - 107 mmol/L    CO2 25.1 22.0 - 29.0 mmol/L    Calcium 10.3 8.6 - 10.5 mg/dL    Total Protein 8.0 6.0 - 8.5 g/dL    Albumin 4.10 3.50 - 5.20 g/dL    ALT (SGPT) 118 (H) 1 - 41 U/L    AST (SGOT) 70 (H) 1 - 40 U/L    Alkaline Phosphatase 136 (H) 39 - 117 U/L     Total Bilirubin 0.5 0.2 - 1.2 mg/dL    eGFR Non African Amer 108 >60 mL/min/1.73    Globulin 3.9 gm/dL    A/G Ratio 1.1 g/dL    BUN/Creatinine Ratio 9.9 7.0 - 25.0    Anion Gap 12.9 mmol/L   Results for orders placed or performed in visit on 07/12/17   ToxASSURE Select 13 (MW)    Specimen: Urine, Clean Catch   Result Value Ref Range    Report Summary FINAL    Results for orders placed or performed during the hospital encounter of 12/27/16   Urine Drug Screen    Specimen: Urine   Result Value Ref Range    Barbiturates Screen, Urine Negative Negative    Benzodiazepine Screen, Urine POSITIVE (H) Negative    Opiate Screen, Urine Negative Negative    THC Screen Interpretation Negative Negative    Amphet/Methamphet, Screen, Urine Negative Negative    Cocaine Screen, Urine Negative Negative    Oxycodone Screen, Urine Negative Negative    Methadone Screen, Urine Negative Negative   AFP Tumor Marker    Specimen: Blood   Result Value Ref Range    AFP Tumor Marker 14.1 (H) 0.0 - 8.3 ng/mL   Ethanol    Specimen: Blood   Result Value Ref Range    Ethanol 0 0.00 - 0.00 gm/dl   Results for orders placed or performed during the hospital encounter of 12/27/16   HCV GENOSURE (R) NS3 / 4A    Specimen: Other   Result Value Ref Range    HCV GenoSure(R) NS3/4A PDF .     HCV GenoSure(R) NS3/4A Comment     HCV GenoSure(R) NS3/4A Interp Comment    ARMSTRONG Fibrosure    Specimen: Blood   Result Value Ref Range    Alpha 2-Macroglobulins, Qn 271 110 - 276 mg/dL    Haptoglobin 36 34 - 200 mg/dL    Apolipoprotein A-1 129 101 - 178 mg/dL    Total Bilirubin 0.6 0.0 - 1.2 mg/dL     (H) 0 - 65 IU/L    ALT (SGPT) 159 (H) 0 - 55 IU/L    Fibrosis Scoring: Comment     Fibrosis Score 0.72 (H) 0.00 - 0.21    Fibrosis Stage Comment     Steatosis Grading 0.90Comment (H) 0.00 - 0.30    ARMSTRONG Scoring Comment     Patient Height 73 Inches    Weight 251 LBS    AST (SGOT) 161 (H) 0 - 40 IU/L    Total Cholesterol 210 (H) 100 - 199 mg/dL    Glucose 142 (H) 65 -  99 mg/dL    Triglycerides 261 (H) 0 - 149 mg/dL    Interpretation Comment     Steatosis Grading Comment     ARMSTRONG Scoring Comment     Limitations: Comment     Comment Comment      *Note: Due to a large number of results and/or encounters for the requested time period, some results have not been displayed. A complete set of results can be found in Results Review.       Some portions of this note have been dictated using voice recognition software and may contain errors and/or omissions.

## 2020-12-22 NOTE — PATIENT INSTRUCTIONS
"BMI for Adults  What is BMI?  Body mass index (BMI) is a number that is calculated from a person's weight and height. BMI can help estimate how much of a person's weight is composed of fat. BMI does not measure body fat directly. Rather, it is an alternative to procedures that directly measure body fat, which can be difficult and expensive.  BMI can help identify people who may be at higher risk for certain medical problems.  What are BMI measurements used for?  BMI is used as a screening tool to identify possible weight problems. It helps determine whether a person is obese, overweight, a healthy weight, or underweight.  BMI is useful for:  · Identifying a weight problem that may be related to a medical condition or may increase the risk for medical problems.  · Promoting changes, such as changes in diet and exercise, to help reach a healthy weight. BMI screening can be repeated to see if these changes are working.  How is BMI calculated?  BMI involves measuring your weight in relation to your height. Both height and weight are measured, and the BMI is calculated from those numbers. This can be done either in English (U[place] or metric measurements. Note that charts and online BMI calculators are available to help you find your BMI quickly and easily without having to do these calculations yourself.  To calculate your BMI in English ([place] measurements:    1. Measure your weight in pounds (lb).  2. Multiply the number of pounds by 703.  ? For example, for a person who weighs 180 lb, multiply that number by 703, which equals 126,540.  3. Measure your height in inches. Then multiply that number by itself to get a measurement called \"inches squared.\"  ? For example, for a person who is 70 inches tall, the \"inches squared\" measurement is 70 inches x 70 inches, which equals 4,900 inches squared.  4. Divide the total from step 2 (number of lb x 703) by the total from step 3 (inches squared): 126,540 ÷ 4,900 = 25.8. This is " "your BMI.  To calculate your BMI in metric measurements:  1. Measure your weight in kilograms (kg).  2. Measure your height in meters (m). Then multiply that number by itself to get a measurement called \"meters squared.\"  ? For example, for a person who is 1.75 m tall, the \"meters squared\" measurement is 1.75 m x 1.75 m, which is equal to 3.1 meters squared.  3. Divide the number of kilograms (your weight) by the meters squared number. In this example: 70 ÷ 3.1 = 22.6. This is your BMI.  What do the results mean?  BMI charts are used to identify whether you are underweight, normal weight, overweight, or obese. The following guidelines will be used:  · Underweight: BMI less than 18.5.  · Normal weight: BMI between 18.5 and 24.9.  · Overweight: BMI between 25 and 29.9.  · Obese: BMI of 30 or above.  Keep these notes in mind:  · Weight includes both fat and muscle, so someone with a muscular build, such as an athlete, may have a BMI that is higher than 24.9. In cases like these, BMI is not an accurate measure of body fat.  · To determine if excess body fat is the cause of a BMI of 25 or higher, further assessments may need to be done by a health care provider.  · BMI is usually interpreted in the same way for men and women.  Where to find more information  For more information about BMI, including tools to quickly calculate your BMI, go to these websites:  · Centers for Disease Control and Prevention: www.cdc.gov  · American Heart Association: www.heart.org  · National Heart, Lung, and Blood [place]: www.nhlbi.nih.gov  Summary  · Body mass index (BMI) is a number that is calculated from a person's weight and height.  · BMI may help estimate how much of a person's weight is composed of fat. BMI can help identify those who may be at higher risk for certain medical problems.  · BMI can be measured using English measurements or metric measurements.  · BMI charts are used to identify whether you are underweight, normal " weight, overweight, or obese.  This information is not intended to replace advice given to you by your health care provider. Make sure you discuss any questions you have with your health care provider.  Document Revised: 09/09/2020 Document Reviewed: 07/17/2020  Elsevier Patient Education © 2020 [employer]

## 2021-01-04 ENCOUNTER — TRANSCRIBE ORDERS (OUTPATIENT)
Dept: PODIATRY | Facility: CLINIC | Age: 36
End: 2021-01-04

## 2021-01-04 ENCOUNTER — OFFICE VISIT (OUTPATIENT)
Dept: PODIATRY | Facility: CLINIC | Age: 36
End: 2021-01-04

## 2021-01-04 VITALS — OXYGEN SATURATION: 98 % | HEIGHT: 73 IN | WEIGHT: 181 LBS | BODY MASS INDEX: 23.99 KG/M2 | HEART RATE: 36 BPM

## 2021-01-04 DIAGNOSIS — Q66.70 PES CAVUS: ICD-10-CM

## 2021-01-04 DIAGNOSIS — G62.9 POLYNEUROPATHY: Primary | ICD-10-CM

## 2021-01-04 DIAGNOSIS — G62.9 POLYNEUROPATHY: ICD-10-CM

## 2021-01-04 DIAGNOSIS — L97.512 SKIN ULCER OF TOE OF RIGHT FOOT WITH FAT LAYER EXPOSED (HCC): Primary | ICD-10-CM

## 2021-01-04 DIAGNOSIS — M77.40 METATARSALGIA, UNSPECIFIED LATERALITY: ICD-10-CM

## 2021-01-04 DIAGNOSIS — Z89.421 HISTORY OF AMPUTATION OF LESSER TOE OF RIGHT FOOT (HCC): ICD-10-CM

## 2021-01-04 PROCEDURE — 11042 DBRDMT SUBQ TIS 1ST 20SQCM/<: CPT | Performed by: PODIATRIST

## 2021-01-04 PROCEDURE — 99213 OFFICE O/P EST LOW 20 MIN: CPT | Performed by: PODIATRIST

## 2021-01-04 NOTE — PROGRESS NOTES
1985  35 y.o. male    Patient presents to clinic today for a wound check on right hallux.   01/04/2021      Chief Complaint   Patient presents with   • Right Foot - Follow-up, Wound Check           History of Present Illness    Christiano King is a 35 y.o. male who presents for follow-up of right foot ulceration, cellulitis.      Past Medical History:   Diagnosis Date   • Acquired deformities of toe(s), unspecified, right foot    • Chronic pain disorder    • Chronic viral hepatitis C (CMS/HCC)    • Cirrhosis of liver (CMS/HCC)    • Generalized pain     Generalized aches and pains      • Hypertensive disorder    • Pain in right leg    • Right upper quadrant pain    • Tobacco dependence syndrome          Past Surgical History:   Procedure Laterality Date   • TOE AMPUTATION      PARTIAL AMPUTATION OF TOE 85718 (1)            Family History   Problem Relation Age of Onset   • Diabetes Father    • Diabetes Brother    • Coronary artery disease Brother    • Diabetes Mother    • Hypertension Mother    • Cancer Paternal Aunt          Social History     Socioeconomic History   • Marital status: Single     Spouse name: Not on file   • Number of children: Not on file   • Years of education: Not on file   • Highest education level: Not on file   Tobacco Use   • Smoking status: Current Every Day Smoker     Types: Cigarettes   • Smokeless tobacco: Current User     Types: Snuff   • Tobacco comment: 10/19 cigarettes/day   Substance and Sexual Activity   • Alcohol use: Yes     Comment: Beer   • Drug use: No   • Sexual activity: Defer         Current Outpatient Medications   Medication Sig Dispense Refill   • amLODIPine (NORVASC) 5 MG tablet Take 1 tablet by mouth Daily. 90 tablet 1   • gabapentin (NEURONTIN) 800 MG tablet 1 tid 90 tablet 2   • pantoprazole (Protonix) 40 MG EC tablet Take 1 tablet by mouth Daily. 90 tablet 1   • sulfamethoxazole-trimethoprim (Bactrim DS) 800-160 MG per tablet Take 1 tablet by mouth 2  "(Two) Times a Day. 20 tablet 0     No current facility-administered medications for this visit.          OBJECTIVE    Pulse (!) 36   Ht 185.4 cm (73\")   Wt 82.1 kg (181 lb)   SpO2 98%   BMI 23.88 kg/m²       Review of Systems   Constitutional: Negative.    Eyes: Negative.    Respiratory: Positive for cough, shortness of breath and wheezing.    Cardiovascular: Negative.    Gastrointestinal: Negative.    Endocrine: Negative.    Genitourinary: Negative.    Musculoskeletal: Positive for back pain.        Joint pain  Ankle pain  Foot pain   Skin: Positive for wound.   Allergic/Immunologic: Negative.    Neurological: Positive for headaches.   Hematological: Negative.    Psychiatric/Behavioral: Negative.          Physical Exam     Constitutional: he appears well-developed and well-nourished.   HEENT: Normocephalic. Atraumatic  CV: No tenderness. RRR  Resp: Non-labored respiration. No wheezes.   Psychiatric: he has a normal mood and affect. his   behavior is normal.      Lower Extremity Exam:  Vascular: DP/PT pulses palpable 2+.   Positive hair growth.   No perimalleolar edema  Neuro: Protective sensation intact, b/l.  DTRs intact  Integument: Right plantar hallux ulceration measuring 0.4 x 0.4 x 0.2 cm.  Fibrogranular base.  No surrounding erythema, no drainage.  No streaking cellulitis.  Positive tenderness palpation.  Nails 1 and 5 on right foot thickened and dystrophic  Skin quality normal  No masses  Webspaces c/d/i  Musculoskeletal: LE muscle strength 5/5.   Gait Normal  Prior partial amputation of right second third and fourth digits.    Right foot wound debridement:  Risks and benefits discussed.  Right hallux ulcer debrided of surrounding hyperkeratosis and devitalized tissue with tissue nipper to level of subq  Pressure hemostasis obtained  Medihoney ointment and dsd applied.            ASSESSMENT AND PLAN    Diagnoses and all orders for this visit:    1. Skin ulcer of toe of right foot with fat layer exposed " (CMS/HCC) (Primary)    2. Polyneuropathy    3. History of amputation of lesser toe of right foot (CMS/HCC)    4. Pes cavus      -Comprehensive foot and ankle exam  -Discussed staples of local wound care including serial debridement, offloading and dressing changes.  Wound debridement as above.  -Continue offloading surgical shoe, local wound care  -Refer to physical therapy for custom orthotic fabrication.  -Recheck 2 weeks        This document has been electronically signed by Khurram Haywood DPM on January 4, 2021 09:34 CST     EMR Dragon/Transcription disclaimer:   Much of this encounter note is an electronic transcription/translation of spoken language to printed text. The electronic translation of spoken language may permit erroneous, or at times, nonsensical words or phrases to be inadvertently transcribed; Although I have reviewed the note for such errors, some may still exist.    Khurram Haywood DPM  1/4/2021  09:34 CST

## 2021-01-12 ENCOUNTER — LAB (OUTPATIENT)
Dept: LAB | Facility: OTHER | Age: 36
End: 2021-01-12

## 2021-01-12 LAB
ALBUMIN SERPL-MCNC: 4.3 G/DL (ref 3.5–5)
ALBUMIN/GLOB SERPL: 1.1 G/DL (ref 1.1–1.8)
ALP SERPL-CCNC: 94 U/L (ref 38–126)
ALPHA-FETOPROTEIN: 12.58 NG/ML (ref 0–8.3)
ALT SERPL W P-5'-P-CCNC: 100 U/L
ANION GAP SERPL CALCULATED.3IONS-SCNC: 8 MMOL/L (ref 5–15)
AST SERPL-CCNC: 62 U/L (ref 17–59)
BASOPHILS # BLD AUTO: 0.03 10*3/MM3 (ref 0–0.2)
BASOPHILS NFR BLD AUTO: 0.6 % (ref 0–1.5)
BILIRUB SERPL-MCNC: 0.6 MG/DL (ref 0.2–1.3)
BUN SERPL-MCNC: 14 MG/DL (ref 7–23)
BUN/CREAT SERPL: 15.1 (ref 7–25)
CALCIUM SPEC-SCNC: 9.8 MG/DL (ref 8.4–10.2)
CHLORIDE SERPL-SCNC: 101 MMOL/L (ref 101–112)
CO2 SERPL-SCNC: 33 MMOL/L (ref 22–30)
CREAT SERPL-MCNC: 0.93 MG/DL (ref 0.7–1.3)
DEPRECATED RDW RBC AUTO: 42.6 FL (ref 37–54)
EOSINOPHIL # BLD AUTO: 0.2 10*3/MM3 (ref 0–0.4)
EOSINOPHIL NFR BLD AUTO: 3.7 % (ref 0.3–6.2)
ERYTHROCYTE [DISTWIDTH] IN BLOOD BY AUTOMATED COUNT: 13.5 % (ref 12.3–15.4)
GFR SERPL CREATININE-BSD FRML MDRD: 91 ML/MIN/1.73 (ref 70–162)
GLOBULIN UR ELPH-MCNC: 3.9 GM/DL (ref 2.3–3.5)
GLUCOSE SERPL-MCNC: 112 MG/DL (ref 70–99)
HCT VFR BLD AUTO: 51.3 % (ref 37.5–51)
HGB BLD-MCNC: 17 G/DL (ref 13–17.7)
INR PPP: 1.08 (ref 0.8–1.2)
IRON 24H UR-MRATE: 70 MCG/DL (ref 59–158)
LYMPHOCYTES # BLD AUTO: 1.66 10*3/MM3 (ref 0.7–3.1)
LYMPHOCYTES NFR BLD AUTO: 30.5 % (ref 19.6–45.3)
MCH RBC QN AUTO: 28.6 PG (ref 26.6–33)
MCHC RBC AUTO-ENTMCNC: 33.1 G/DL (ref 31.5–35.7)
MCV RBC AUTO: 86.4 FL (ref 79–97)
MONOCYTES # BLD AUTO: 0.4 10*3/MM3 (ref 0.1–0.9)
MONOCYTES NFR BLD AUTO: 7.4 % (ref 5–12)
NEUTROPHILS NFR BLD AUTO: 3.15 10*3/MM3 (ref 1.7–7)
NEUTROPHILS NFR BLD AUTO: 57.8 % (ref 42.7–76)
PLATELET # BLD AUTO: 122 10*3/MM3 (ref 140–450)
PMV BLD AUTO: 10.7 FL (ref 6–12)
POTASSIUM SERPL-SCNC: 4.3 MMOL/L (ref 3.4–5)
PROT SERPL-MCNC: 8.2 G/DL (ref 6.3–8.6)
PROTHROMBIN TIME: 14 SECONDS (ref 11.1–15.3)
RBC # BLD AUTO: 5.94 10*6/MM3 (ref 4.14–5.8)
SODIUM SERPL-SCNC: 142 MMOL/L (ref 137–145)
WBC # BLD AUTO: 5.44 10*3/MM3 (ref 3.4–10.8)

## 2021-01-12 PROCEDURE — 81596 NFCT DS CHRNC HCV 6 ASSAYS: CPT | Performed by: PHYSICIAN ASSISTANT

## 2021-01-12 PROCEDURE — 80053 COMPREHEN METABOLIC PANEL: CPT | Performed by: INTERNAL MEDICINE

## 2021-01-12 PROCEDURE — 83540 ASSAY OF IRON: CPT | Performed by: PHYSICIAN ASSISTANT

## 2021-01-12 PROCEDURE — 85610 PROTHROMBIN TIME: CPT | Performed by: INTERNAL MEDICINE

## 2021-01-12 PROCEDURE — 87522 HEPATITIS C REVRS TRNSCRPJ: CPT | Performed by: PHYSICIAN ASSISTANT

## 2021-01-12 PROCEDURE — 87340 HEPATITIS B SURFACE AG IA: CPT | Performed by: PHYSICIAN ASSISTANT

## 2021-01-12 PROCEDURE — 82105 ALPHA-FETOPROTEIN SERUM: CPT | Performed by: PHYSICIAN ASSISTANT

## 2021-01-12 PROCEDURE — 86317 IMMUNOASSAY INFECTIOUS AGENT: CPT | Performed by: PHYSICIAN ASSISTANT

## 2021-01-12 PROCEDURE — 85025 COMPLETE CBC W/AUTO DIFF WBC: CPT | Performed by: INTERNAL MEDICINE

## 2021-01-13 LAB
HBV SURFACE AB SER-ACNC: 32.2 MIU/ML
HBV SURFACE AG SERPL QL IA: NORMAL

## 2021-01-14 LAB
HCV RNA SERPL NAA+PROBE-ACNC: NORMAL IU/ML
HCV RNA SERPL NAA+PROBE-LOG IU: 6.45 LOG10 IU/ML
TEST INFORMATION: NORMAL

## 2021-01-15 LAB
A2 MACROGLOB SERPL-MCNC: 350 MG/DL (ref 110–276)
ALT SERPL W P-5'-P-CCNC: 92 IU/L (ref 0–55)
APO A-I SERPL-MCNC: 125 MG/DL (ref 101–178)
BILIRUB SERPL-MCNC: 0.5 MG/DL (ref 0–1.2)
FIBROSIS SCORING:: ABNORMAL
FIBROSIS STAGE SERPL QL: ABNORMAL
GGT SERPL-CCNC: 142 IU/L (ref 0–65)
HAPTOGLOB SERPL-MCNC: 44 MG/DL (ref 17–317)
HCV AB SER QL: ABNORMAL
LABORATORY COMMENT REPORT: ABNORMAL
LIVER FIBR SCORE SERPL CALC.FIBROSURE: 0.74 (ref 0–0.21)
NECROINFLAMM ACTIVITY SCORING:: ABNORMAL
NECROINFLAMMATORY ACT GRADE SERPL QL: ABNORMAL
NECROINFLAMMATORY ACT SCORE SERPL: 0.69 (ref 0–0.17)
SERVICE CMNT-IMP: ABNORMAL

## 2021-01-18 ENCOUNTER — TELEPHONE (OUTPATIENT)
Dept: PODIATRY | Facility: CLINIC | Age: 36
End: 2021-01-18

## 2021-01-24 DIAGNOSIS — G89.4 CHRONIC PAIN SYNDROME: Chronic | ICD-10-CM

## 2021-01-25 RX ORDER — GABAPENTIN 800 MG/1
TABLET ORAL
Qty: 90 TABLET | Refills: 2 | OUTPATIENT
Start: 2021-01-25

## 2021-01-26 ENCOUNTER — OFFICE VISIT (OUTPATIENT)
Dept: PODIATRY | Facility: CLINIC | Age: 36
End: 2021-01-26

## 2021-01-26 VITALS — HEIGHT: 73 IN | WEIGHT: 181 LBS | OXYGEN SATURATION: 98 % | BODY MASS INDEX: 23.99 KG/M2 | HEART RATE: 105 BPM

## 2021-01-26 DIAGNOSIS — Z89.421 HISTORY OF AMPUTATION OF LESSER TOE OF RIGHT FOOT (HCC): ICD-10-CM

## 2021-01-26 DIAGNOSIS — L97.511 SKIN ULCER OF TOE OF RIGHT FOOT, LIMITED TO BREAKDOWN OF SKIN (HCC): Primary | ICD-10-CM

## 2021-01-26 DIAGNOSIS — L84 FOOT CALLUS: ICD-10-CM

## 2021-01-26 DIAGNOSIS — G62.9 POLYNEUROPATHY: ICD-10-CM

## 2021-01-26 PROCEDURE — 99213 OFFICE O/P EST LOW 20 MIN: CPT | Performed by: PODIATRIST

## 2021-01-26 NOTE — PROGRESS NOTES
1985  35 y.o. male   FLORA Harrington - 11/11/2020    Patient presents today for a recheck of his right foot ulcer.    01/26/2021    Chief Complaint   Patient presents with   • Right Foot - Follow-up, Skin Ulcer, Wound Check, Pain           History of Present Illness    Christiano King is a 35 y.o. male who presents for follow-up of right foot ulceration.  Has set appointment for custom orthotic molding.    Past Medical History:   Diagnosis Date   • Acquired deformities of toe(s), unspecified, right foot    • Chronic pain disorder    • Chronic viral hepatitis C (CMS/HCC)    • Cirrhosis of liver (CMS/HCC)    • Foot ulcer (CMS/HCC)    • Generalized pain     Generalized aches and pains      • Hypertensive disorder    • Pain in right leg    • Right upper quadrant pain    • Tobacco dependence syndrome          Past Surgical History:   Procedure Laterality Date   • TOE AMPUTATION      PARTIAL AMPUTATION OF TOE 29278 (1)            Family History   Problem Relation Age of Onset   • Diabetes Father    • Diabetes Brother    • Coronary artery disease Brother    • Diabetes Mother    • Hypertension Mother    • Cancer Paternal Aunt          Social History     Socioeconomic History   • Marital status: Single     Spouse name: Not on file   • Number of children: Not on file   • Years of education: Not on file   • Highest education level: Not on file   Tobacco Use   • Smoking status: Current Every Day Smoker     Types: Cigarettes   • Smokeless tobacco: Current User     Types: Snuff   • Tobacco comment: 10/19 cigarettes/day   Substance and Sexual Activity   • Alcohol use: Yes     Comment: Beer   • Drug use: No   • Sexual activity: Defer         Current Outpatient Medications   Medication Sig Dispense Refill   • amLODIPine (NORVASC) 5 MG tablet Take 1 tablet by mouth Daily. 90 tablet 1   • gabapentin (NEURONTIN) 800 MG tablet 1 tid 90 tablet 2   • pantoprazole (Protonix) 40 MG EC tablet Take 1 tablet by mouth Daily. 90 tablet 1  "  • sulfamethoxazole-trimethoprim (Bactrim DS) 800-160 MG per tablet Take 1 tablet by mouth 2 (Two) Times a Day. 20 tablet 0     No current facility-administered medications for this visit.          OBJECTIVE    Pulse 105   Ht 185.4 cm (73\")   Wt 82.1 kg (181 lb)   SpO2 98%   BMI 23.88 kg/m²       Review of Systems   Constitutional: Negative.    Eyes: Negative.    Respiratory: Positive for cough, shortness of breath and wheezing.    Cardiovascular: Negative.    Gastrointestinal: Negative.    Endocrine: Negative.    Genitourinary: Negative.    Musculoskeletal: Positive for back pain.        Joint pain  Ankle pain  Foot pain   Skin: Positive for wound.   Neurological: Positive for headaches.   Psychiatric/Behavioral: Negative.          Physical Exam     Constitutional: he appears well-developed and well-nourished.   HEENT: Normocephalic. Atraumatic  CV: No tenderness. RRR  Resp: Non-labored respiration. No wheezes.   Psychiatric: he has a normal mood and affect. his   behavior is normal.      Lower Extremity Exam:  Vascular: DP/PT pulses palpable 2+.   Positive hair growth.   No perimalleolar edema  Neuro: Protective sensation intact, b/l.  DTRs intact  Integument: Right plantar hallux ulceration measuring 0.4 x 0.4 x 0.2 cm.  Fibrogranular base.  No surrounding erythema, no drainage.  No streaking cellulitis.  Positive tenderness palpation.  Nails 1 and 5 on right foot thickened and dystrophic  Thick hyperkeratosis subfourth MTPJ, heel on right  Skin quality normal  No masses  Webspaces c/d/i  Musculoskeletal: LE muscle strength 5/5.   Gait Normal  Prior partial amputation of right second third and fourth digits.        ASSESSMENT AND PLAN    Diagnoses and all orders for this visit:    1. Skin ulcer of toe of right foot, limited to breakdown of skin (CMS/HCC) (Primary)    2. Polyneuropathy    3. History of amputation of lesser toe of right foot (CMS/HCC)    4. Foot callus      -Comprehensive foot and ankle " exam  -Discussed staples of local wound care including serial debridement, offloading and dressing changes.  -Continue offloading surgical shoe, local wound care  -Awaiting custom orthotic fabrication.  -We will obtain [facility] pharmacy compounded pain cream and urea  -Briefly discussed possible revision of partial hallux amputation to a more smooth amputation level if necessary  -Recheck 2 weeks        This document has been electronically signed by Khurram Haywood DPM on January 26, 2021 15:44 CST     EMR Dragon/Transcription disclaimer:   Much of this encounter note is an electronic transcription/translation of spoken language to printed text. The electronic translation of spoken language may permit erroneous, or at times, nonsensical words or phrases to be inadvertently transcribed; Although I have reviewed the note for such errors, some may still exist.    Khurram Haywood DPM  1/26/2021  15:44 CST

## 2021-01-28 DIAGNOSIS — G89.4 CHRONIC PAIN SYNDROME: Chronic | ICD-10-CM

## 2021-01-28 RX ORDER — AMLODIPINE BESYLATE 5 MG/1
5 TABLET ORAL DAILY
Qty: 90 TABLET | Refills: 1 | Status: SHIPPED | OUTPATIENT
Start: 2021-01-28 | End: 2021-11-19 | Stop reason: SDUPTHER

## 2021-01-28 RX ORDER — GABAPENTIN 800 MG/1
TABLET ORAL
Qty: 90 TABLET | Refills: 2 | Status: SHIPPED | OUTPATIENT
Start: 2021-01-28

## 2021-01-28 RX ORDER — PANTOPRAZOLE SODIUM 40 MG/1
40 TABLET, DELAYED RELEASE ORAL DAILY
Qty: 90 TABLET | Refills: 1 | Status: SHIPPED | OUTPATIENT
Start: 2021-01-28

## 2021-02-01 ENCOUNTER — HOSPITAL ENCOUNTER (OUTPATIENT)
Dept: PHYSICAL THERAPY | Facility: HOSPITAL | Age: 36
Setting detail: THERAPIES SERIES
Discharge: HOME | End: 2021-02-01

## 2021-02-01 DIAGNOSIS — M77.42 METATARSALGIA OF BOTH FEET: Primary | ICD-10-CM

## 2021-02-01 DIAGNOSIS — M77.41 METATARSALGIA OF BOTH FEET: Primary | ICD-10-CM

## 2021-02-01 DIAGNOSIS — G62.9 POLYNEUROPATHY: ICD-10-CM

## 2021-02-01 PROCEDURE — 97161 PT EVAL LOW COMPLEX 20 MIN: CPT | Performed by: PHYSICAL THERAPIST

## 2021-02-01 NOTE — THERAPY EVALUATION
Outpatient Physical Therapy Ortho Initial Evaluation  [facility]     Patient Name: Christiano King  : 1985  MRN: [id number]  Today's Date: 2021      Visit Date: 2021    Patient Active Problem List   Diagnosis   • Chronic hepatitis C without hepatic coma (CMS/HCC)   • Gastritis without bleeding   • Hypertensive disorder   • Tobacco dependence syndrome   • Acquired deformities of toe(s), unspecified, right foot   • Chronic pain syndrome   • History of narcotic addiction (CMS/HCC)   • Methamphetamine use (CMS/HCC)   • Self-destructive behavior        Past Medical History:   Diagnosis Date   • Acquired deformities of toe(s), unspecified, right foot    • Chronic pain disorder    • Chronic viral hepatitis C (CMS/HCC)    • Cirrhosis of liver (CMS/HCC)    • Foot ulcer (CMS/HCC)    • Generalized pain     Generalized aches and pains      • Hypertensive disorder    • Pain in right leg    • Right upper quadrant pain    • Tobacco dependence syndrome         Past Surgical History:   Procedure Laterality Date   • TOE AMPUTATION      PARTIAL AMPUTATION OF TOE 96547 (1)          Visit Dx:     ICD-10-CM ICD-9-CM   1. Metatarsalgia of both feet  M77.41 726.70    M77.42    2. Polyneuropathy  G62.9 356.9             PT Ortho     Row Name 21 1012       Subjective Comments    Subjective Comments  Patient presents today with very little to say even when questioned. Suspect poor understanding of what was explained.Patient appears with hisotry of amuptation of toes on right in past    -BB       Precautions and Contraindications    Precautions  poor understanding suspect   -BB       Subjective Pain    Able to rate subjective pain?  yes  -BB    Subjective Pain Comment  not reported  -BB       Posture/Observations    Posture/Observations Comments  severe rigid right foot in supination and right met head drop. Left foot mobility is rigid with a medium arch. Severely dry skin of the heels bilaterally.  bilateral 5th met head callus. Very poor shoes noted with sole falling off and shoe strings minimally present. Right great toe present redlike and with two pinsize wounds that appear closed.   -BB      User Key  (r) = Recorded By, (t) = Taken By, (c) = Cosigned By    Initials Name Provider Type    Mila Esparza PT DPT Physical Therapist                      Therapy Education  Education Details: poor understanding of orthotic education suspect      PT OP Goals     Row Name 02/01/21 1012          PT Short Term Goals    STG Date to Achieve  02/22/21  -BB     STG 1  Independent in wear schedule and skin inspection   -BB        Time Calculation    PT Goal Re-Cert Due Date  02/22/21  -BB       User Key  (r) = Recorded By, (t) = Taken By, (c) = Cosigned By    Initials Name Provider Type    Mila Esparza, ALLY DPT Physical Therapist          PT Assessment/Plan     Row Name 02/01/21 1012          PT Assessment    Functional Limitations  Other (comment) impaired mechanics   -BB     Impairments  Poor body mechanics;Integumentary integrity  -BB     Assessment Comments  Patient presents with poor understanding of education given today and poor communication skills. Patient is noted to have very wore out shoes and I glued soles back to shoes to try and aid tripping hazards and encouraged to obtain new shoes if possible. Patient is noted to have poor hygiene of feet with severely dry heels and multiple sites of callusing. Patient is noted to have R>L uncompensated ridgid feet with limited mobility of mid and forefoot noted. Orthotics to be fabricated per MD orders.   -BB     Rehab Potential  Poor  -BB     Patient/caregiver participated in establishment of treatment plan and goals  Yes  -BB     Patient would benefit from skilled therapy intervention  Yes  -BB        PT Plan    Predicted Duration of Therapy Intervention (PT)  PRN  -BB     Planned CPT's?  PT EVAL LOW COMPLEXITY: 64156;PT ORTHOTIC MGMT/TRAIN EA 15 MIN: 93209;PT  THER SUPP EA 15 MIN  -BB     PT Plan Comments  orthotic checkout and adjustment PRN  -BB       User Key  (r) = Recorded By, (t) = Taken By, (c) = Cosigned By    Initials Name Provider Type    Mila Esparza PT DPT Physical Therapist            OP Exercises     Row Name 02/01/21 1012             Subjective Comments    Subjective Comments  Patient presents today with very little to say even when questioned. Suspect poor understanding of what was explained.Patient appears with hisotry of amuptation of toes on right in past    -BB         Subjective Pain    Able to rate subjective pain?  yes  -BB      Subjective Pain Comment  not reported  -BB        User Key  (r) = Recorded By, (t) = Taken By, (c) = Cosigned By    Initials Name Provider Type    Mila Esparza PT DPBLUE Physical Therapist                                  Time Calculation:     Start Time: 1012  Stop Time: 1045  Time Calculation (min): 33 min     Therapy Charges for Today     Code Description Service Date Service Provider Modifiers Qty    73258478626 HC PT EVAL LOW COMPLEXITY 1 2/1/2021 Mila Palma PT DPT GP 1    [id number]  PT-CUSTOM ORTHOTICS-LEVEL 2 2/1/2021 Mila Palma PT DPT  1                    Mila Palma PT DPT  2/1/2021

## 2021-02-22 ENCOUNTER — OFFICE VISIT (OUTPATIENT)
Dept: FAMILY MEDICINE CLINIC | Facility: CLINIC | Age: 36
End: 2021-02-22

## 2021-02-22 VITALS
SYSTOLIC BLOOD PRESSURE: 136 MMHG | BODY MASS INDEX: 23.51 KG/M2 | DIASTOLIC BLOOD PRESSURE: 78 MMHG | HEIGHT: 73 IN | WEIGHT: 177.4 LBS

## 2021-02-22 DIAGNOSIS — I10 ESSENTIAL HYPERTENSION: Primary | Chronic | ICD-10-CM

## 2021-02-22 DIAGNOSIS — M20.61 ACQUIRED DEFORMITIES OF TOE(S), UNSPECIFIED, RIGHT FOOT: Chronic | ICD-10-CM

## 2021-02-22 DIAGNOSIS — F11.21 HISTORY OF NARCOTIC ADDICTION (HCC): Chronic | ICD-10-CM

## 2021-02-22 DIAGNOSIS — F17.200 TOBACCO DEPENDENCE SYNDROME: Chronic | ICD-10-CM

## 2021-02-22 DIAGNOSIS — B18.2 CHRONIC HEPATITIS C WITHOUT HEPATIC COMA (HCC): Chronic | ICD-10-CM

## 2021-02-22 DIAGNOSIS — G89.4 CHRONIC PAIN SYNDROME: Chronic | ICD-10-CM

## 2021-02-22 PROCEDURE — 99214 OFFICE O/P EST MOD 30 MIN: CPT | Performed by: FAMILY MEDICINE

## 2021-02-22 NOTE — PROGRESS NOTES
Subjective   Christiano King is a 35 y.o. male.  Follow-up hypertension chronic pain syndrome hepatitis C history of drug abuse etc.  In interim sexy been to visit both podiatry and GI medicines under evaluation for hep C treatment.  Is maintain current medicines Eugenio is updated and correct.  Last lab work per GI medicine was acceptable.  Still smokes unfortunately.  Not as tangential on conversation today as has been in the past.    History of Present Illness   HPI    The following portions of the patient's history were reviewed and updated as appropriate: allergies, current medications, past family history, past medical history, past social history, past surgical history and problem list.    Review of Systems  Review of Systems   Constitutional: Negative for activity change, appetite change, fatigue and unexpected weight change.   HENT: Negative for trouble swallowing and voice change.    Eyes: Negative for redness and visual disturbance.   Respiratory: Negative for cough and wheezing.    Cardiovascular: Negative for chest pain and palpitations.   Gastrointestinal: Negative for abdominal pain, constipation, diarrhea, nausea and vomiting.   Genitourinary: Negative for urgency.   Musculoskeletal: Positive for arthralgias (Foot pain chronic right). Negative for joint swelling.   Neurological: Negative for syncope and headaches.   Hematological: Negative for adenopathy.   Psychiatric/Behavioral: Negative for sleep disturbance.       Objective   Physical Exam  Physical Exam  Constitutional:       Appearance: He is well-developed.   HENT:      Head: Normocephalic.   Eyes:      Pupils: Pupils are equal, round, and reactive to light.   Neck:      Musculoskeletal: Normal range of motion.      Thyroid: No thyromegaly.   Cardiovascular:      Rate and Rhythm: Normal rate and regular rhythm.      Heart sounds: Normal heart sounds. No murmur. No friction rub. No gallop.    Pulmonary:      Breath sounds: Normal breath  "sounds.   Abdominal:      General: There is no distension.      Palpations: Abdomen is soft. There is no mass.      Tenderness: There is no abdominal tenderness.   Musculoskeletal: Normal range of motion.      Comments: Foot deformity is noted defer to podiatry get up and go 3 to 5-second   Skin:     General: Skin is warm and dry.   Neurological:      Mental Status: He is alert and oriented to person, place, and time.      Deep Tendon Reflexes: Reflexes are normal and symmetric.   Psychiatric:         Mood and Affect: Affect is blunt.         Speech: Speech is delayed.         Behavior: Behavior is cooperative.      Comments: Less rambling           Visit Vitals  /78   Ht 185.4 cm (73\")   Wt 80.5 kg (177 lb 6.4 oz)   BMI 23.41 kg/m²     Body mass index is 23.41 kg/m².      Assessment/Plan   Diagnoses and all orders for this visit:    1. Essential hypertension (Primary)    2. Chronic hepatitis C without hepatic coma (CMS/HCC)    3. History of narcotic addiction (CMS/HCC)    4. Acquired deformities of toe(s), unspecified, right foot    5. Chronic pain syndrome    6. Tobacco dependence syndrome     on stopping smoking.  3-10m     Counseled mainly on following up with subspecialist.  Continue medicines as outlined.  Continue gabapentin per protocol recheck again in 6 months  "

## 2021-02-23 ENCOUNTER — TELEPHONE (OUTPATIENT)
Dept: PODIATRY | Facility: CLINIC | Age: 36
End: 2021-02-23

## 2021-02-23 ENCOUNTER — OFFICE VISIT (OUTPATIENT)
Dept: PODIATRY | Facility: CLINIC | Age: 36
End: 2021-02-23

## 2021-02-23 ENCOUNTER — HOSPITAL ENCOUNTER (OUTPATIENT)
Facility: HOSPITAL | Age: 36
Setting detail: HOSPITAL OUTPATIENT SURGERY
End: 2021-02-23
Attending: PODIATRIST | Admitting: PODIATRIST

## 2021-02-23 VITALS — HEIGHT: 73 IN | OXYGEN SATURATION: 99 % | HEART RATE: 98 BPM | BODY MASS INDEX: 23.51 KG/M2 | WEIGHT: 177.4 LBS

## 2021-02-23 DIAGNOSIS — G62.9 POLYNEUROPATHY: ICD-10-CM

## 2021-02-23 DIAGNOSIS — M77.41 METATARSALGIA OF RIGHT FOOT: ICD-10-CM

## 2021-02-23 DIAGNOSIS — L97.511 SKIN ULCER OF TOE OF RIGHT FOOT, LIMITED TO BREAKDOWN OF SKIN (HCC): Primary | ICD-10-CM

## 2021-02-23 DIAGNOSIS — Z89.421 HISTORY OF AMPUTATION OF LESSER TOE OF RIGHT FOOT (HCC): ICD-10-CM

## 2021-02-23 DIAGNOSIS — L84 FOOT CALLUS: ICD-10-CM

## 2021-02-23 PROCEDURE — 99214 OFFICE O/P EST MOD 30 MIN: CPT | Performed by: PODIATRIST

## 2021-02-23 RX ORDER — CLINDAMYCIN PHOSPHATE 900 MG/50ML
900 INJECTION INTRAVENOUS ONCE
Status: CANCELLED | OUTPATIENT
Start: 2021-03-05 | End: 2021-02-23

## 2021-02-23 NOTE — PROGRESS NOTES
1985  35 y.o. male   FLORA Harrington - 02/22/2021    Patient presents today for a recheck of his right foot ulcer.    02/23/2021      Chief Complaint   Patient presents with   • Right Foot - Follow-up, Wound Check           History of Present Illness    Christiano King is a 35 y.o. male who presents for follow-up of right foot ulceration.  Has been fit for custom orthotics and shoes but has not yet received them.  Patient is interested in revision of his partial hallux amputation today.    Past Medical History:   Diagnosis Date   • Acquired deformities of toe(s), unspecified, right foot    • Chronic pain disorder    • Chronic viral hepatitis C (CMS/HCC)    • Cirrhosis of liver (CMS/HCC)    • Foot ulcer (CMS/HCC)    • Generalized pain     Generalized aches and pains      • Hypertensive disorder    • Pain in right leg    • Right upper quadrant pain    • Tobacco dependence syndrome          Past Surgical History:   Procedure Laterality Date   • TOE AMPUTATION      PARTIAL AMPUTATION OF TOE 66098 (1)            Family History   Problem Relation Age of Onset   • Diabetes Father    • Diabetes Brother    • Coronary artery disease Brother    • Diabetes Mother    • Hypertension Mother    • Cancer Paternal Aunt          Social History     Socioeconomic History   • Marital status: Single     Spouse name: Not on file   • Number of children: Not on file   • Years of education: Not on file   • Highest education level: Not on file   Tobacco Use   • Smoking status: Current Every Day Smoker     Types: Cigarettes   • Smokeless tobacco: Current User     Types: Snuff   • Tobacco comment: 10/19 cigarettes/day   Substance and Sexual Activity   • Alcohol use: Yes     Comment: Beer   • Drug use: No   • Sexual activity: Defer         Current Outpatient Medications   Medication Sig Dispense Refill   • amLODIPine (NORVASC) 5 MG tablet Take 1 tablet by mouth Daily. 90 tablet 1   • gabapentin (NEURONTIN) 800 MG tablet 1 tid 90 tablet 2  "  • pantoprazole (Protonix) 40 MG EC tablet Take 1 tablet by mouth Daily. 90 tablet 1     No current facility-administered medications for this visit.          OBJECTIVE    Pulse 98   Ht 185.4 cm (73\")   Wt 80.5 kg (177 lb 6.4 oz)   SpO2 99%   BMI 23.41 kg/m²       Review of Systems   Constitutional: Negative.    Eyes: Negative.    Respiratory: Positive for cough, shortness of breath and wheezing.    Cardiovascular: Negative.    Gastrointestinal: Negative.    Endocrine: Negative.    Genitourinary: Negative.    Musculoskeletal: Positive for back pain.        Joint pain  Ankle pain  Foot pain   Skin: Positive for wound.   Neurological: Positive for headaches.   Psychiatric/Behavioral: Negative.          Physical Exam     Constitutional: he appears well-developed and well-nourished.   HEENT: Normocephalic. Atraumatic  CV: No tenderness. RRR  Resp: Non-labored respiration. No wheezes.   Psychiatric: he has a normal mood and affect. his   behavior is normal.      Lower Extremity Exam:  Vascular: DP/PT pulses palpable 2+.   Positive hair growth.   No perimalleolar edema  Neuro: Protective sensation intact, b/l.  DTRs intact  Integument: Right plantar hallux ulceration measuring 0.4 x 0.4 x 0.2 cm.  Fibrogranular base.  No surrounding erythema, no drainage.  No streaking cellulitis.  Positive tenderness palpation.  Nails 1 and 5 on right foot thickened and dystrophic  Thick hyperkeratosis sub 5th MTPJ, heel on right  Skin quality normal  No masses  Webspaces c/d/i  Musculoskeletal: LE muscle strength 5/5.   Equinus contracture bilateral  Gait Normal  Prior partial amputation of right second third and fourth digits.        ASSESSMENT AND PLAN    Diagnoses and all orders for this visit:    1. Skin ulcer of toe of right foot, limited to breakdown of skin (CMS/HCC) (Primary)  -     Case Request    2. Polyneuropathy    3. Foot callus    4. Metatarsalgia of right foot  -     Case Request    5. History of amputation of lesser " toe of right foot (CMS/[facility])  -     Case Request      -Comprehensive foot and ankle exam  -Awaiting custom orthotic fabrication.  -Patient interested in revision of his right hallux partial amputation due to persistent subsequent wound formation noted at the stump.  We also discussed treatment for intractable plantar keratosis of his right lateral forefoot involving a resection of his fifth metatarsal head.  Patient also interested in this option.  We discussed all risk, benefits and potential complication as well as expected postoperative course.  Patient wishes to proceed.  Plan for 3/5/2021  -Recheck 2 weeks        This document has been electronically signed by Khurram Haywood DPM on February 23, 2021 12:35 CST     EMR Dragon/Transcription disclaimer:   Much of this encounter note is an electronic transcription/translation of spoken language to printed text. The electronic translation of spoken language may permit erroneous, or at times, nonsensical words or phrases to be inadvertently transcribed; Although I have reviewed the note for such errors, some may still exist.    Khurram Haywood DPM  2/23/2021  12:35 CST

## 2021-03-03 ENCOUNTER — TELEPHONE (OUTPATIENT)
Dept: PODIATRY | Facility: CLINIC | Age: 36
End: 2021-03-03

## 2021-03-03 NOTE — TELEPHONE ENCOUNTER
No answer. Left message for patient to return phone call regarding his surgery on Friday. Patient did not go for Covid test on Tuesday so therefore his surgery is cancelled.

## 2021-03-08 ENCOUNTER — TELEPHONE (OUTPATIENT)
Dept: PODIATRY | Facility: CLINIC | Age: 36
End: 2021-03-08

## 2021-03-08 NOTE — TELEPHONE ENCOUNTER
PT REQUESTS A CALL BACK RE WANTS TO RESCHEUDULE SAMEDAY SURGERY AND COVID TEST.  CALL BACK # [phone].  THANK YOU.

## 2021-03-15 ENCOUNTER — TELEPHONE (OUTPATIENT)
Dept: PODIATRY | Facility: CLINIC | Age: 36
End: 2021-03-15

## 2021-03-15 NOTE — TELEPHONE ENCOUNTER
Spoke with patient. Rescheduled for March 26. Explained to patient that on Tuesday March 23rd between 9am and 10am ONLY he will have to come for a covid test at the [facility]. Patient repeated every instruction back to me.

## 2021-03-23 ENCOUNTER — LAB (OUTPATIENT)
Dept: LAB | Facility: HOSPITAL | Age: 36
End: 2021-03-23

## 2021-03-23 DIAGNOSIS — Z01.818 PREOP TESTING: Primary | ICD-10-CM

## 2021-03-23 LAB — SARS-COV-2 N GENE RESP QL NAA+PROBE: NOT DETECTED

## 2021-03-23 PROCEDURE — 87635 SARS-COV-2 COVID-19 AMP PRB: CPT

## 2021-03-23 PROCEDURE — C9803 HOPD COVID-19 SPEC COLLECT: HCPCS

## 2021-03-24 VITALS — BODY MASS INDEX: 23.86 KG/M2 | WEIGHT: 180 LBS | HEIGHT: 73 IN

## 2021-03-24 RX ORDER — SODIUM CHLORIDE, SODIUM GLUCONATE, SODIUM ACETATE, POTASSIUM CHLORIDE AND MAGNESIUM CHLORIDE 526; 502; 368; 37; 30 MG/100ML; MG/100ML; MG/100ML; MG/100ML; MG/100ML
1000 INJECTION, SOLUTION INTRAVENOUS CONTINUOUS PRN
Status: CANCELLED | OUTPATIENT
Start: 2021-03-26

## 2021-03-25 ENCOUNTER — ANESTHESIA EVENT (OUTPATIENT)
Dept: PERIOP | Facility: HOSPITAL | Age: 36
End: 2021-03-25

## 2021-03-26 ENCOUNTER — ANESTHESIA (OUTPATIENT)
Dept: PERIOP | Facility: HOSPITAL | Age: 36
End: 2021-03-26

## 2021-03-29 ENCOUNTER — TELEPHONE (OUTPATIENT)
Dept: PODIATRY | Facility: CLINIC | Age: 36
End: 2021-03-29

## 2021-04-08 ENCOUNTER — TELEPHONE (OUTPATIENT)
Dept: FAMILY MEDICINE CLINIC | Facility: CLINIC | Age: 36
End: 2021-04-08

## 2021-04-08 NOTE — TELEPHONE ENCOUNTER
[facility] with [facility] FYI on Mr. King to let Dr. Harrington know that Mr. King picked up a refill of his gabapentin #90 on 4-7-2021 when he was detained last night(4-7-2021) he only has 18 tablets left in his bottle.

## 2021-04-21 ENCOUNTER — TELEPHONE (OUTPATIENT)
Dept: PODIATRY | Facility: CLINIC | Age: 36
End: 2021-04-21

## 2021-04-23 DIAGNOSIS — G89.4 CHRONIC PAIN SYNDROME: Chronic | ICD-10-CM

## 2021-04-23 RX ORDER — GABAPENTIN 800 MG/1
TABLET ORAL
Qty: 90 TABLET | Refills: 2 | OUTPATIENT
Start: 2021-04-23

## 2021-06-02 ENCOUNTER — OFFICE VISIT (OUTPATIENT)
Dept: FAMILY MEDICINE CLINIC | Facility: CLINIC | Age: 36
End: 2021-06-02

## 2021-06-02 VITALS
OXYGEN SATURATION: 98 % | WEIGHT: 163 LBS | BODY MASS INDEX: 21.6 KG/M2 | HEART RATE: 84 BPM | TEMPERATURE: 97.2 F | DIASTOLIC BLOOD PRESSURE: 80 MMHG | SYSTOLIC BLOOD PRESSURE: 130 MMHG | HEIGHT: 73 IN

## 2021-06-02 DIAGNOSIS — I10 ESSENTIAL HYPERTENSION: Chronic | ICD-10-CM

## 2021-06-02 DIAGNOSIS — G62.9 POLYNEUROPATHY: Primary | ICD-10-CM

## 2021-06-02 DIAGNOSIS — Z89.421 HISTORY OF AMPUTATION OF LESSER TOE OF RIGHT FOOT (HCC): ICD-10-CM

## 2021-06-02 PROCEDURE — 99213 OFFICE O/P EST LOW 20 MIN: CPT | Performed by: STUDENT IN AN ORGANIZED HEALTH CARE EDUCATION/TRAINING PROGRAM
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
## 2021-06-11 DIAGNOSIS — G89.4 CHRONIC PAIN SYNDROME: Chronic | ICD-10-CM

## 2021-06-11 RX ORDER — GABAPENTIN 800 MG/1
TABLET ORAL
Qty: 90 TABLET | Refills: 2 | OUTPATIENT
Start: 2021-06-11

## 2021-06-14 DIAGNOSIS — G89.4 CHRONIC PAIN SYNDROME: Chronic | ICD-10-CM

## 2021-06-14 RX ORDER — GABAPENTIN 800 MG/1
TABLET ORAL
Qty: 90 TABLET | Refills: 2 | OUTPATIENT
Start: 2021-06-14

## 2021-06-17 DIAGNOSIS — G89.4 CHRONIC PAIN SYNDROME: Chronic | ICD-10-CM

## 2021-06-17 RX ORDER — GABAPENTIN 800 MG/1
TABLET ORAL
Qty: 90 TABLET | Refills: 2 | OUTPATIENT
Start: 2021-06-17

## 2021-06-18 ENCOUNTER — OFFICE VISIT (OUTPATIENT)
Dept: PODIATRY | Facility: CLINIC | Age: 36
End: 2021-06-18

## 2021-06-18 VITALS
OXYGEN SATURATION: 97 % | HEART RATE: 84 BPM | SYSTOLIC BLOOD PRESSURE: 103 MMHG | HEIGHT: 73 IN | BODY MASS INDEX: 21.6 KG/M2 | WEIGHT: 163 LBS | DIASTOLIC BLOOD PRESSURE: 70 MMHG

## 2021-06-18 DIAGNOSIS — G62.9 POLYNEUROPATHY: ICD-10-CM

## 2021-06-18 DIAGNOSIS — L84 FOOT CALLUS: ICD-10-CM

## 2021-06-18 DIAGNOSIS — B35.1 ONYCHOMYCOSIS: ICD-10-CM

## 2021-06-18 DIAGNOSIS — L97.511 SKIN ULCER OF TOE OF RIGHT FOOT, LIMITED TO BREAKDOWN OF SKIN (HCC): Primary | ICD-10-CM

## 2021-06-18 DIAGNOSIS — M77.41 METATARSALGIA OF RIGHT FOOT: ICD-10-CM

## 2021-06-18 PROCEDURE — 99213 OFFICE O/P EST LOW 20 MIN: CPT | Performed by: PODIATRIST

## 2021-06-18 RX ORDER — HYDROCODONE BITARTRATE AND ACETAMINOPHEN 5; 325 MG/1; MG/1
TABLET ORAL
COMMUNITY
Start: 2021-06-14

## 2021-06-18 NOTE — PROGRESS NOTES
1985  36 y.o. male   Mookie H - 02/22/2021    Patient presents today for a recheck of his right foot ulcer.    06/18/2021      Chief Complaint   Patient presents with   • Right Foot - Pain           History of Present Illness    Christiano King is a 36 y.o. male who presents for follow-up of right foot ulceration, pain.  Previously planned for her right fifth metatarsal head excision and revision of partial hallux amputation however patient no showed his procedure date.  He would like to reschedule this if possible.  States he is also having issues getting his gabapentin filled due to being discharged from his primary care's office.  Patient does follow in pain management.  Past Medical History:   Diagnosis Date   • Acquired deformities of toe(s), unspecified, right foot    • Chronic pain disorder    • Chronic viral hepatitis C (CMS/HCC)    • Cirrhosis of liver (CMS/HCC)    • Foot ulcer (CMS/HCC)    • Generalized pain     Generalized aches and pains      • Hypertensive disorder    • Pain in right leg    • Right upper quadrant pain    • Tobacco dependence syndrome          Past Surgical History:   Procedure Laterality Date   • TOE AMPUTATION      PARTIAL AMPUTATION OF TOE 78475 (1)            Family History   Problem Relation Age of Onset   • Diabetes Father    • Diabetes Brother    • Coronary artery disease Brother    • Diabetes Mother    • Hypertension Mother    • Cancer Paternal Aunt          Social History     Socioeconomic History   • Marital status: Single     Spouse name: Not on file   • Number of children: Not on file   • Years of education: Not on file   • Highest education level: Not on file   Tobacco Use   • Smoking status: Current Every Day Smoker     Types: Cigarettes   • Smokeless tobacco: Current User     Types: Snuff   • Tobacco comment: 10/19 cigarettes/day   Vaping Use   • Vaping Use: Never used   Substance and Sexual Activity   • Alcohol use: Yes     Comment: Beer   • Drug use: No  "  • Sexual activity: Not Currently         Current Outpatient Medications   Medication Sig Dispense Refill   • amLODIPine (NORVASC) 5 MG tablet Take 1 tablet by mouth Daily. 90 tablet 1   • gabapentin (NEURONTIN) 800 MG tablet 1 tid (Patient taking differently: Take 800 mg by mouth 3 (Three) Times a Day. 1 tid) 90 tablet 2   • HYDROcodone-acetaminophen (NORCO) 5-325 MG per tablet      • pantoprazole (Protonix) 40 MG EC tablet Take 1 tablet by mouth Daily. 90 tablet 1     No current facility-administered medications for this visit.         OBJECTIVE    /70   Pulse 84   Ht 185.4 cm (73\")   Wt 73.9 kg (163 lb)   SpO2 97%   BMI 21.51 kg/m²       Review of Systems   Constitutional: Negative.    Eyes: Negative.    Respiratory: Positive for cough, shortness of breath and wheezing.    Cardiovascular: Negative.    Gastrointestinal: Negative.    Endocrine: Negative.    Genitourinary: Negative.    Musculoskeletal: Positive for back pain.        Joint pain  Ankle pain  Foot pain   Skin: Positive for wound.   Neurological: Positive for headaches.   Psychiatric/Behavioral: Negative.          Physical Exam     Constitutional: he appears well-developed and well-nourished.   HEENT: Normocephalic. Atraumatic  CV: No tenderness. RRR  Resp: Non-labored respiration. No wheezes.   Psychiatric: he has a normal mood and affect. his   behavior is normal.      Lower Extremity Exam:  Vascular: DP/PT pulses palpable 2+.   Positive hair growth.   No perimalleolar edema  Neuro: Protective sensation intact, b/l.  DTRs intact  Integument: Right plantar hallux ulceration now healed.  Mild hyperkeratosis.  No surrounding erythema, no drainage.  No streaking cellulitis.  Positive tenderness palpation.  Nails 1 and 5 on right foot thickened and dystrophic  Thick hyperkeratosis sub 5th MTPJ, heel on right  Skin quality normal  No masses  Webspaces c/d/i  Musculoskeletal: LE muscle strength 5/5.   Equinus contracture bilateral  Gait " Normal  Prior partial amputation of right second third and fourth digits.        ASSESSMENT AND PLAN    Diagnoses and all orders for this visit:    1. Skin ulcer of toe of right foot, limited to breakdown of skin (CMS/HCC) (Primary)    2. Foot callus    3. Polyneuropathy    4. Onychomycosis    5. Metatarsalgia of right foot      -Comprehensive foot and ankle exam  -Patient interested in revision of his right hallux partial amputation due to persistent subsequent wound formation noted at the stump.  We also discussed treatment for intractable plantar keratosis of his right lateral forefoot involving a resection of his fifth metatarsal head.  Patient also interested in this option.  We discussed all risk, benefits and potential complication as well as expected postoperative course.  Patient wishes to proceed.  Due to previous noncompliance will contact patient to schedule at a later date.  -Discussed that we will not be able to fill patient's normal gabapentin.  Patient does have pain management in place advised him to obtain all of his controlled substances from the same location or contact his primary care for additional help.  -Recheck 2 weeks        This document has been electronically signed by Khurram Haywood DPM on June 23, 2021 15:15 CDT     EMR Dragon/Transcription disclaimer:   Much of this encounter note is an electronic transcription/translation of spoken language to printed text. The electronic translation of spoken language may permit erroneous, or at times, nonsensical words or phrases to be inadvertently transcribed; Although I have reviewed the note for such errors, some may still exist.    Khurram Haywood DPM  6/23/2021  15:15 CDT

## 2021-06-22 ENCOUNTER — TELEPHONE (OUTPATIENT)
Dept: PODIATRY | Facility: CLINIC | Age: 36
End: 2021-06-22

## 2021-07-07 ENCOUNTER — TELEPHONE (OUTPATIENT)
Dept: PODIATRY | Facility: CLINIC | Age: 36
End: 2021-07-07

## 2021-07-07 NOTE — TELEPHONE ENCOUNTER
Called patient again. Phone call was answered but then I was hung up on by person who answered the phone.

## 2021-07-07 NOTE — TELEPHONE ENCOUNTER
Tried to return patients call to offer July 21 for surgery. Patients phone never rang and went straight to voicemail.

## 2021-07-07 NOTE — TELEPHONE ENCOUNTER
Pt wanted to know if you had scheduled his surgery? I advised patient we had tried to call him several times but he failed to answer his phone. I let him know it would probably be tomorrow before anyone got back to him. I also let him know he should be prepared for a call and answer his phone.

## 2021-07-08 NOTE — TELEPHONE ENCOUNTER
Can you please put case request in for July 21. Once in I will call back and let him know about a pre op appointment.

## 2021-07-13 DIAGNOSIS — M77.41 METATARSALGIA OF RIGHT FOOT: ICD-10-CM

## 2021-07-13 DIAGNOSIS — L97.512 SKIN ULCER OF TOE OF RIGHT FOOT WITH FAT LAYER EXPOSED (HCC): Primary | ICD-10-CM

## 2021-07-13 RX ORDER — CLINDAMYCIN PHOSPHATE 900 MG/50ML
900 INJECTION INTRAVENOUS ONCE
Status: CANCELLED | OUTPATIENT
Start: 2021-07-21 | End: 2021-07-13

## 2021-07-16 ENCOUNTER — TELEPHONE (OUTPATIENT)
Dept: PODIATRY | Facility: CLINIC | Age: 36
End: 2021-07-16

## 2021-07-16 NOTE — TELEPHONE ENCOUNTER
CHAD, LUIS, CALLED TO LET DR KLEIN KNOW PT DID NOT SHOW UP FOR PREOP AND DIDN'T ANSWER HIS PHONE WHEN CALLED TO RESCHEDULE.  CALL BACK # [phone].  THANK YOU.

## 2021-07-16 NOTE — TELEPHONE ENCOUNTER
If patient calls back- He has a pre-op appointment scheduled today at 1:00- if he cant make it please forward to [phone] to have rescheduled to Monday if possible.  Covid test is at 9:00am Monday July 19

## 2021-07-16 NOTE — TELEPHONE ENCOUNTER
PT REQUESTS A CALL BACK RE HE FORGOT WHAT TIME HIS COVID TEST IS SCHEDULED.  CALL BACK # [phone].  THANK YOU.

## 2021-07-19 ENCOUNTER — TELEPHONE (OUTPATIENT)
Dept: PODIATRY | Facility: CLINIC | Age: 36
End: 2021-07-19

## 2021-07-21 ENCOUNTER — TELEPHONE (OUTPATIENT)
Dept: PODIATRY | Facility: CLINIC | Age: 36
End: 2021-07-21

## 2021-08-29 RX ORDER — AMLODIPINE BESYLATE 5 MG/1
TABLET ORAL
Qty: 90 TABLET | Refills: 1 | OUTPATIENT
Start: 2021-08-29

## 2021-09-17 ENCOUNTER — TELEPHONE (OUTPATIENT)
Dept: PODIATRY | Facility: CLINIC | Age: 36
End: 2021-09-17

## 2021-09-17 NOTE — TELEPHONE ENCOUNTER
PT CALLED WANTING TO MAKE AN APPT TO SCHEDULE SURGERY.  PER CHART REVIEW 8-29-21 DR KLEIN DOES NOT WANT TO SCHEDULE SURGERY FOR THIS PT.  I TOLD PT THE PERSON WHO SCHEDULES SURGERY IS NOT HERE AT THIS TIME AND SOMEONE WILL RETURN HIS CALL WHEN POSSIBLE.  I DO NOT KNOW IF PT HAS BEEN TOLD THAT HE WILL NOT BE SCHEDULED WITH DR KLEIN YET.  CALL BACK # [phone].  THANK YOU.

## 2021-10-03 RX ORDER — PANTOPRAZOLE SODIUM 40 MG/1
TABLET, DELAYED RELEASE ORAL
Qty: 90 TABLET | Refills: 1 | OUTPATIENT
Start: 2021-10-03

## 2021-11-01 RX ORDER — PANTOPRAZOLE SODIUM 40 MG/1
TABLET, DELAYED RELEASE ORAL
Qty: 90 TABLET | Refills: 1 | OUTPATIENT
Start: 2021-11-01

## 2021-11-01 RX ORDER — AMLODIPINE BESYLATE 5 MG/1
TABLET ORAL
Qty: 90 TABLET | Refills: 1 | OUTPATIENT
Start: 2021-11-01

## 2022-05-14 NOTE — TELEPHONE ENCOUNTER
PATIENT CALLING IN STATING HE SAW DR. CONTRERAS RECENTLY AND IS WANTING TO SEE IF HE WILL WRITE HIM A SCRIPT TO GET DR. RAMONA BURGOS.    PATIENT CALLBACK # [phone]     [address] - [phone] [facility] [phone]   [phone]   154.9